# Patient Record
Sex: FEMALE | Race: WHITE | NOT HISPANIC OR LATINO | URBAN - METROPOLITAN AREA
[De-identification: names, ages, dates, MRNs, and addresses within clinical notes are randomized per-mention and may not be internally consistent; named-entity substitution may affect disease eponyms.]

---

## 2019-08-19 ENCOUNTER — HOSPITAL ENCOUNTER (OUTPATIENT)
Dept: PSYCHOLOGY | Facility: CLINIC | Age: 49
Discharge: HOME | End: 2019-08-19
Payer: COMMERCIAL

## 2019-08-19 DIAGNOSIS — G31.84 MILD COGNITIVE IMPAIRMENT: ICD-10-CM

## 2019-08-19 PROCEDURE — 96133 NRPSYC TST EVAL PHYS/QHP EA: CPT | Performed by: CLINICAL NEUROPSYCHOLOGIST

## 2019-08-19 PROCEDURE — 96137 PSYCL/NRPSYC TST PHY/QHP EA: CPT | Performed by: CLINICAL NEUROPSYCHOLOGIST

## 2019-08-19 PROCEDURE — 96136 PSYCL/NRPSYC TST PHY/QHP 1ST: CPT | Performed by: CLINICAL NEUROPSYCHOLOGIST

## 2019-08-19 PROCEDURE — 96132 NRPSYC TST EVAL PHYS/QHP 1ST: CPT | Performed by: CLINICAL NEUROPSYCHOLOGIST

## 2019-08-19 RX ORDER — TRAZODONE HYDROCHLORIDE 100 MG/1
TABLET ORAL
Refills: 0 | COMMUNITY
Start: 2019-07-16

## 2019-08-19 RX ORDER — MONTELUKAST SODIUM 10 MG/1
TABLET ORAL
Refills: 1 | COMMUNITY
Start: 2019-08-12

## 2019-08-19 RX ORDER — LANOLIN ALCOHOL/MO/W.PET/CERES
1 CREAM (GRAM) TOPICAL
Refills: 0 | COMMUNITY
Start: 2019-05-23

## 2019-08-19 RX ORDER — FLUOXETINE 10 MG/1
CAPSULE ORAL
Refills: 0 | COMMUNITY
Start: 2019-07-31

## 2019-08-19 RX ORDER — HYDROXYZINE HYDROCHLORIDE 50 MG/1
TABLET, FILM COATED ORAL
Refills: 0 | COMMUNITY
Start: 2019-07-16

## 2019-08-19 RX ORDER — FOLIC ACID 1 MG/1
1000 TABLET ORAL
Refills: 0 | COMMUNITY
Start: 2019-05-15

## 2019-08-19 RX ORDER — CYANOCOBALAMIN (VITAMIN B-12) 500 MCG
LOZENGE ORAL
Refills: 3 | COMMUNITY
Start: 2019-06-05

## 2019-08-19 RX ORDER — METOPROLOL SUCCINATE 100 MG/1
100 TABLET, EXTENDED RELEASE ORAL
Refills: 5 | COMMUNITY
Start: 2019-07-22

## 2019-08-19 RX ORDER — HYDROCHLOROTHIAZIDE 25 MG/1
25 TABLET ORAL
Refills: 1 | COMMUNITY
Start: 2019-07-16

## 2019-08-26 ENCOUNTER — HOSPITAL ENCOUNTER (OUTPATIENT)
Dept: PSYCHOLOGY | Facility: CLINIC | Age: 49
Discharge: HOME | End: 2019-08-26
Payer: COMMERCIAL

## 2019-08-26 DIAGNOSIS — G31.84 MILD COGNITIVE IMPAIRMENT: ICD-10-CM

## 2019-08-26 PROCEDURE — 96132 NRPSYC TST EVAL PHYS/QHP 1ST: CPT | Performed by: CLINICAL NEUROPSYCHOLOGIST

## 2019-08-29 NOTE — PROGRESS NOTES
NEUROPSYCHOLOGICAL EVALUATION    CONFIDENTIAL   FOR PROFESSIONAL USE ONLY    Name: Sarah Beth Person                                    : 1970  Evaluation date: 2019      Sarah Beth Person, : 1970, is a 49 y.o. right handed female, referred by Dr. Maynor Mcdermott for Comprehensive Neuropsychological Evaluation for concern of decline in short-term memory. Relevant information was obtained from a clinical interview with Sarah Beth Person and her mother, along with a review of available medical records.    Review of an office note with Dr. Mcdermott dated 2019 indicated that Ms. Person was seen in consultation for gait dysfunction and neuropathy.  She has a history of recent significant weight loss, alcohol abuse, acute pancreatitis, cognitive complaints including poor memory, confusion, and lack of comprehension, tingling of the hands and feet, pain, poor balance.  Records indicate her last full day of work was in 2019 and she was asked to leave.  Review of a brain MRI dated Iris 10, 2019 was notable for: 1.  No evidence of intracranial mass or acute infarct. 2.  White matter changes as described below which are nonspecific but can be seen with microangiopathy    According to the history provided, she has been experiencing cognitive, physical, and emotional changes over the past at least year and a half.  Cognitively, Ms. Person reported that she has been having difficulty with short-term memory, such as forgetting conversations, relying much more heavily on writing information down, and remembering things to do.  She reported difficulty with attention and concentration.  Her family described the changes as somewhat gradual but more dramatic around the months leading up to hospitalization for malnutrition in May 2019.  She has been experiencing multiple health complications.  She was hospitalized for acute pancreatitis in 2018 with associated weight loss and malnutrition.  At that time,  she had been drinking alcohol heavily.  She was also hospitalized in May 2019 for malnutrition.  She has since stopped drinking and nutrition has significantly improved.  She is currently living in a respite portion of assisted living and also receives support from her parents.  She has not been working since April 2019 subsequent to cognitive, physical, and emotional stress.  At this time, she is independent in all of her activities of daily living.  She does her own shopping.  She was recently cleared for driving by her podiatrist as she had voluntarily stopped due to neuropathy in her feet.  Up until April/May 2019 she was working full-time as a  but took an official leave of absence in May which has now been extended until October 2019.  She hopes to return to work.  She reported that her difficulties with work were more associated with her overall physical health status and depressed mood versus confusion or difficulty comprehending tasks at work.    Physically, as noted above, she has been improving in terms of nutrition and has remained abstinent from alcohol.  She has a prior history of gastric bypass surgery (approximately 15 years ago).  She reported appetite has been improving.  She does continue to experience mild balance deficits secondary to neuropathy but also states that this has been significantly improving.  She denied any problems with dizziness or falls.  She reported sleep is variable, often with middle insomnia.  She does take prescribed trazodone to aid sleep which she finds helpful.    Emotionally, Ms. Person reported a great deal of psychosocial stress associated with her health status, divorce, and custody arrangement with her 10-year old son.  She reported that prior to the stress she has been experiencing, she shared 50-50 custody with her ex- but has had less custody since.  She reported depressed mood and anxiety.  She has consulted with the office of Dr. Ramesh  Marcial for psychiatry and also initiated treatment with individual counseling.  However, she reported that she has not started with a regular therapist yet (has seen 2 different ones so far).  She denied suicidal ideation, roderick, hallucinations, or delusions.      Current Outpatient Prescriptions   Medication Sig Dispense Refill   • FLUoxetine (PROzac) 10 mg capsule TAKE 1 CAP IN THE AM WITH FOOD X 1 WEEK THEN INCREASE TO 2 CAPS DAILY IN AM FOR 3 WEEKS  0   • folic acid (FOLVITE) 1 mg tablet Take 1,000 mcg by mouth once daily.  0   • hydrochlorothiazide (HYDRODIURIL) 25 mg tablet Take 25 mg by mouth once daily.  1   • hydrOXYzine (ATARAX) 50 mg tablet TAKE 1 TABLET BY MOUTH EVERYDAY AT BEDTIME  0   • magnesium oxide (MAG-OX) 400 mg (241.3 mg magnesium) tablet Take 1 tablet by mouth once daily.  0   • metoprolol succinate XL (TOPROL-XL) 100 mg 24 hr tablet Take 100 mg by mouth once daily.  5   • montelukast (SINGULAIR) 10 mg tablet TAKE 1 TABLET BY MOUTH EVERYDAY AT BEDTIME  1   • traZODone (DESYREL) 100 mg tablet TAKE 1 TABLET BY MOUTH EVERYDAY AT BEDTIME  0   • VITAMIN B-12 500 mcg lozenge PLACE 2 TABLETS(1000MCG) UNDER TONGUE ONCE DAILY  3     No current facility-administered medications for this encounter.        Past Medical History:   Diagnosis Date   • Neuropathy    • Pancreatitis        Past Surgical History:   Procedure Laterality Date   • CHOLECYSTECTOMY     • GASTRIC BYPASS         Family History   Problem Relation Age of Onset   • Breast cancer Maternal Grandmother    • Diabetes Maternal Grandmother    • Early death Maternal Grandfather        Social History     Social History   • Marital status:      Spouse name: N/A   • Number of children: 1   • Years of education: 16     Occupational History   •       Social History Main Topics   • Smoking status: Current Some Day Smoker   • Smokeless tobacco: Never Used   • Alcohol use Yes      Comment: none recently   • Drug use: No   • Sexual  activity: Not Asked     Other Topics Concern   • None     Social History Narrative   • None       Past Psychological History: Ms. Person has a prior history of outpatient psychotherapy for depression and anxiety and adjustment concerns.  She has no history of inpatient psychiatric treatment.  She has a history of alcohol abuse but sober for the past several months.  She has no history of formal inpatient detoxification or rehabilitation.    Birth/Development History: Normal.     Education: Ms. Person earned her bachelor's degree in business administration in accounting from [employer].  She reported being an average to above average student, earning mostly A's and B's throughout high school and college.  She has no history of learning disability or ADHD.     Employment: Ms. Person has worked for the past 20 years as a .  She is currently on a leave of absence.      SUMMARY OF TEST RESULTS:     Behavioral Observations: Ms. Person arrived on time for the evaluation.  She was dressed casually and appropriately.  Speech was fluent for casual conversation.  Her mother assisted in providing more of the detailed background information and presenting problems.  Ms. Person was calm, pleasant, and cooperative throughout the evaluation.  Her mood/affect were euthymic. She appeared to put for sufficient effort and results are presumed to be an accurate depiction of current neuropsychological functioning.    Test Results:     Effort: On a formal measure of effort administered as part of the evaluation, she obtained adequate scores (TOMM, 49/50, 50/50). In addition, measures of effort embedded in other tests were also suggestive of sufficient effort (Reliable Digit Span = 11).     Intellectual Functioning: Her overall intellectual abilities fell within the average range (WAIS-IV, Full Scale IQ = 98, 45th percentile). Her estimated baseline abilities are likely in the average range based on her level  of education and performance on crystallized measures of intelligence. She performed in the average range on crystallized measures, assessing verbal reasoning [Verbal Comprehension Index (VCI), 55th percentile]. Her nonverbal reasoning skills were also average [Perceptual Reasoning Index (HALEIGH), 45th percentile]. Her auditory working memory and processing speed skills were average [Working Memory Index (WMI), 42nd percentile; Processing Speed Index (PSI), 42nd percentile].      Verbal Skills and Language: On specific tests within the language domain, her performance fell grossly within  Expectations, with mild weakness in verbal fluency. Her expressive vocabulary skills were in the average range (WAIS-IV, Vocabulary, 63rd percentile). General fund of information was also average (Information, 37th percentile). Her fluency using phonemic cues was low average and on the low end of average with semantic cues (COWAT, 16th percentile; ANT, 25th percentile).     Visual-Perceptual and Visual-Motor Skills: Her visual perceptual skills were intact. Specifically, block construction skills were average (WAIS-IV, Block Design, 63rd percentile). Her performance on a measure of spatial manipulation and part whole relationships was low average (Visual Puzzles, 9th percentile). On a paper and pencil measure assessing her visual motor coordination and speed, she performed in the average range (WAIS-IV, Coding, 63rd percentile) when required to transcribe abstract symbols.     Reasoning Skills: Measures of reasoning were intact. On a measure of verbal reasoning in which she identified conceptual similarities between target words, she scored in the average range (WAIS-IV, Similarities, 63rd percentile). Ms. Wells scored within the high average range on a subtest measuring pattern completion skills (WAIS-IV, Matrix Reasoning, 75th percentile).     Attention and Executive Functioning: Her verbal attention and auditory working memory  skills were intact. Her rote recall of numbers was average (WAIS-IV, Digit Span, 63rd percentile). She was able to recite a forward span of 6, backward span of 5, and sequencing span of 6. Her mental mathematics skills were average (WAIS-IV, Arithmetic, 25th percentile).     Her performance on paper and pencil measures requiring visual attention and speed fell within. She scored within the average range on a measure of visual scanning skills in which she scanned rows to find matching symbols (WAIS-IV, Symbol Search, 25th percentile). On a scanning and sequencing task, her ability to connect a series of numbers was high average for speed (Trail Making Test, Part A, 84th percentile).    Her performance on more complex executive skills showed variability. Specifically, on a test of rapid visual scanning and set-shifting between two ongoing and alternating series, her score was average with no errors (Trail Making Test, Part B, 37th percentile). On an untimed measure assessing novel abstract problem-solving, concept formation, mental flexibility, learning from mistakes and using working memory, her score was moderately impaired, in the borderline range. She completed 3/6 categories with an elevated number of perseverative responses (WCST Total Errors, 4th percentile).     Learning and Memory: In the verbal memory domain, her performance was significantly impaired. On a list-learning task (CVLT-3), her single trial learning was in the borderline range (Trial 1, 5th percentile). She demonstrated a flat learning curve reflecting an overall extremely low range range score (Trials 1-5 Free Recall, <1st percentile). After a short delay, her free and cued recall from the original target list was in the extremely low range (Short-Delay Free Recall, <1st percentile; Cued Recall, 1st percentile). Following a longer delay, her free and cued recall were also in the extremely low range (Long-Delay Free Recall, <1st percentile; Cued  Recall, 1st percentile). She recognized targets from non-targets on a recognition task with low average ability. Her immediate recall for two orally presented stories was in the borderline range and in the extremely low range after a 20-minute delay. She required verbal cues/promts to aid recall (WMS-IV, Logical Memory I, 5th percentile; Logical Memory II, 1st percentile).     In the visual memory realm, she scored in the extremely low range when producing simple designs from memory immediately after a brief exposure. Following a delay, her recall was also in the extremely low range (WMS-IV, Visual Reproduction I, 1st percentile; Visual Reproduction II, <1st percentile). Recognition recall fell below expectations (3-9th percentile).     Behavioral and Psychological Functioning: To assess the presence of psychological symptoms, she was administered self-report questionnaires. her overall score on a measure assessing for depression revealed moderate symptoms (BDI-II). A measure of anxiety (ALETHA) showed moderate symptoms.       Diagnostic Impression:  Ms. Sarah Beth Person is a 49-year-old, right-hand-dominant female who was seen for neuropsychological evaluation for an assessment of cognitive and emotional status in the context of a history of multiple medical complications and concern of cognitive changes.    Her baseline abilities are estimated to be in the average range.  Results of the current evaluation indicated a profile of overall average range intellectual functioning, with consistent scores across the various indices.  She demonstrated intact neurocognitive performance across measures of verbal comprehension, including expressive vocabulary, long-term recall, and verbal reasoning.  Her visual-spatial and perceptual reasoning skills were intact.  Her performance on measures of processing speed and auditory working memory also fell within expectations.  Her scores on measures of verbal fluency were mildly reduced,  in the low average to low end of average range.  However, there was evidence of more significant cognitive decline from estimates of baseline abilities on measures of verbal learning and memory and visual learning and memory.  Her results suggested difficulty encoding and recalling new information.  There was also evidence of moderate impairment in more complex abstract reasoning/executive functioning.  On self-report measures, she endorsed moderate symptoms of acute depression and anxiety.    Diagnostically, results are consistent with mild neurocognitive impairment, with primary weakness is in memory and executive functioning.  The etiology is likely multifactorial including recent medical complications, alcohol abuse, and depression and anxiety.  Repeat neuropsychological evaluation in 6 months will aid diagnostic clarity and will assist in determining if scores and cognitive skills are improving.     Recommendations:  1. Ms. Person has expressed interest in returning to work at the end of her leave of absence in October.  I would strongly recommend that she be participating in cognitive remediation/speech therapy to assist with developing compensatory strategies to aid weaknesses in memory and executive functioning.  I would also recommend that she would start back on a gradual, part-time basis.  2. I also strongly recommend that she be participating in regular counseling/psychotherapy to continue to develop adaptive coping strategies to manage depression and anxiety and continue to maintain sobriety.  She is currently following with the office of Dr. Ceja.   3. Neuropsychological re-evaluation in 6 months to assess for changes to aid diagnostic clarity and the current evaluation can serve as a baseline for future comparison.     Thank you for allowing me to participate in the care of your patient.  Please feel free to contact me at [phone] with any additional questions regarding this  report.    Respectfully submitted,         MARVIN Steiner.D

## 2019-09-03 NOTE — PROGRESS NOTES
Patient Name: Sarah Beth Person  YOB: 1970  Medical Record #: [id number]      Sarah Beth Person was seen for neuropsychological testing feedback on 8/26/19. Results of neuropsychological evaluation were reviewed with the patient and her parents. Patient expressed understanding and agreement with treatment recommendations.  Please refer to the full report for a comprehensive summary of diagnostic impressions and recommendations.     Report submitted to referring physician.

## 2019-09-04 ENCOUNTER — TRANSCRIBE ORDERS (OUTPATIENT)
Dept: SCHEDULING | Facility: REHABILITATION | Age: 49
End: 2019-09-04

## 2019-09-04 DIAGNOSIS — G31.84 MILD COGNITIVE IMPAIRMENT: ICD-10-CM

## 2019-09-04 DIAGNOSIS — R41.841 COGNITIVE COMMUNICATION DEFICIT: Primary | ICD-10-CM

## 2019-09-10 ENCOUNTER — HOSPITAL ENCOUNTER (OUTPATIENT)
Dept: SPEECH THERAPY | Facility: REHABILITATION | Age: 49
Setting detail: THERAPIES SERIES
Discharge: HOME | End: 2019-09-10
Attending: PSYCHIATRY & NEUROLOGY
Payer: COMMERCIAL

## 2019-09-10 DIAGNOSIS — G31.84 MILD COGNITIVE IMPAIRMENT: ICD-10-CM

## 2019-09-10 DIAGNOSIS — R41.841 COGNITIVE COMMUNICATION DEFICIT: ICD-10-CM

## 2019-09-10 PROCEDURE — 92523 SPEECH SOUND LANG COMPREHEN: CPT | Mod: GN

## 2019-09-10 NOTE — OP SLP TREATMENT LOG
Patient Specific Functional Scale (PSFS):  52.5%  Work Fulltime: 4/10  Socializin/10  Engaged in TV: 6/10  Caring for child: 5/10    21/40- 52.5%

## 2019-09-10 NOTE — LETTER
[address] [place] [address]  [phone]  [facility] Fax: [phone]    SPEECH THERAPY PLAN OF CARE    Patient Name: Sarah Beth Person    Certification Dates:  From: 09/10/19  To: 19  Frequency: 2 times/week     Duration: 8 weeks  Other:      Provider: Rosalinda Lilly CCC-SLP   Referring Provider: Maynor Mcdermott MD  PCP: Angelina Naqvi      Payor: Payor: [employer] / Plan: [employer] / Product Type: Commercial /   Medical Diagnosis: No primary diagnosis found.     Rehab Potential: good, to achieve stated therapy goals    Planned Services:  Speech therapies will focus on CPT 68358 Speech Lang Voice Comm and/or Auditory Proc (Treatment of speech, language, voice, communication and/or hearing processing disorder), .    By signing this plan of care, I certify this plan of care as correct and necessary for the patient.      Physician Signature: ________________________________    Date: _____________      Thank you for this referral. Please contact our department with any questions.     Rosalinda Lilyl CCC-SLP      Referring Provider: By co-signing this Plan of Care (POC), you agree with the planned services and interventions recommended by the therapist.         SLP EVALUATION FOR OUTPATIENT THERAPY    Patient: Sarah Beth Person   MRN: [id number]  : 1970 49 y.o.  Referring Physician: Maynor Mcdermott MD  Date of Visit: 9/10/2019      Certification Dates:  09/10/19 through 19    Recommended Frequency & Duration:  2 times/week for up to 8 weeks     Diagnosis:   1. Mild cognitive impairment    2. Cognitive communication deficit        Chief Complaints:   Chief Complaint   Patient presents with   • Cognition     overwhelmed, memory loss, language differences, inattentive       Precautions: no known precautions/restrictions, contact (Shingles diagnosis)    Past Medical History:   Past Medical History:   Diagnosis Date   • Neuropathy    • Pancreatitis        Past  Surgical History:   Past Surgical History:   Procedure Laterality Date   • CHOLECYSTECTOMY     • GASTRIC BYPASS           LEARNING ASSESSMENT    Assessment completed: Yes    Learner name:  Sarah Beth Person    Relationship: Patient    Learning Barriers:  Learning barriers: Emotional and Cognitive    Preferred Language: English     Needed: No    Learning New Concepts: Demonstration      CO-LEARNER ASSESSMENT:    Completed: No            OBJECTIVE MEASUREMENTS/DATA:    Eval Assessment         Evaluation Assessment and Plan - 09/10/19 3353        Evaluation Assessment and Plan    Plan of Care reviewed and patient/family in agreement Yes    Comments Discussed focus of therapy to focus on Time management, Executive Function, Attention, auditory and visual, problem solving and memory.      Speech Pathology Potential/Prognosis good, to achieve stated therapy goals    Problem List impaired cognition    Actions taken Continue with counseling     Clinical Assessment Ms. Person, a 48 yo female presents to the [facility] department for an evaluation of current cogntive skills following progressive decline in cognitive function.  Ms. Person present with cognitive communication deficits judged as mild to moderate.  Ms. Person identified moderate attention deficits characterized by mild to moderate deficits in focusing, sustaining, slecting and alternating attention.  Deficits in attention result in decreased recall, problem solving and planning and intiiation.  Ms. Person would benefit from skilled speech therapy to address strategies in attention, time management, and planning to support memory initation and execution.      Planned Services CPT 51720 Speech Lang Voice Comm and/or Auditory Proc (Treatment of speech, language, voice, communication and/or hearing processing disorder)        General Information         General Information - 09/10/19 0429        Session Details     Document Type initial evaluation    Mode of Treatment individual therapy;speech language pathology    Patient/Family Observations Pt was 10 minutes late to evaluation secondary to traffic from [place].         Time Calculation    Start Time 0809    Stop Time 0900    Time Calculation (min) 51 min       General Information    Onset of Illness/Injury or Date of Surgery 06/10/19    Referring Physician Dr. Mcdermott    Pertinent History of Current Functional Problem Pt report progression since 3 years depression progressed, leave from work early spring 2019, went to parents in [place], admitted to assisted living, assisted with meals and meds, for appoximately 3 weeks, continued to stay with parents, returned home, Last month has been going to counseling and physicians.  Counseling with Dr. Ceja in [place].  MRI dated:     Existing Precautions/Restrictions no known precautions/restrictions;contact   Shingles diagnosis        Pain and Vitals         Pain and Vitals - 09/10/19 0818        Pain/Comfort/Sleep    Presence of Pain complains of pain/discomfort    Preferred Pain Scale number (Numeric Rating Pain Scale)    Pain Body Location leg   abdomen    Pain Rating (0-10): Pre Activity 4       Pain Interventions    Intervention none    Post Intervention Comments none        Living Environment         Living Environment - 09/10/19 0831        Living Environment    Lives With alone;child(glen), dependent   10 year old Ryan    Living Arrangements apartment    Transportation Available car   returning to driving        PLOF         Prior Level of Function - 09/10/19 0832        OTHER    Previous level of function Fulltime as a , mother of a ten year old, spending time with 10 year old and various sports, spend time with friends.         Falls Assessment         Falls Assessment - 09/10/19 0834        Initial Falls Assessment    One or more falls in the last year No        Language Assessment          Language  Assessment - 09/10/19 0921        Expressive Language Assessment    Pragmatics (Communication) flat affect;initiation problems;other (see comments);mild impairment;topic maintenance problems    Comment, Conversational Speech Ongoing observation, consider relevance, topic maintenance, word retrieval in conversation.     Verbal Repetition mild impairment   Decreased sentence repetition on RIPA-2, suspect secondary to decreased auditory attention    Comment, Expression Ongoing assessment for effects of cognitive changes on language organization.        Comprehension Assessment    Auditory Comprehension (Communication) mild impairment    Able to Follow Commands (Communication) success, 2-step commands;other (see comments)   self interruption resulted in inability to recall 3-step commands.     Comment HCLS Immediate: Complex Ideational Material, Paragraph Auditory comprehension- 3/10        Cognition         Executive Function and Cognition - 09/10/19 0841        Executive Function Assessment    Executive Function divergent reasoning skill impairment;evaluative thinking (judgment) is impaired;initiation of behavior to achieve desired result is impaired;unaware of strengths and weaknesses in executive function;mental flexibility is impaired;organizational analysis is impaired;processing skills are inadequate;unable to plan/organize behaviors to achieve desired result    Comment, Executive Function Pt was required encouragement to 'go on' with task, difficulty 'regrouping' withing timed task, attempt to discontinue.        Memory    Immediate memory RIPA-2: 27/30, 91st, HCLS- Immediate Memory for Complex Ideational Material: 3/10,     Delayed memory Immediate Delay: 3/3, 10 min delay 0/3    Working memory Mental calculation: 3/5, Mental Manipulation: 2/3       Attention    Focused Attention Results of Attention Rating Scale (self rating) indicate MODERATE to SEVERE identified problems in overall ATTENTION.  MODERATE  FOCOSED ATTENTION problems: Slowness in responding, Needing prompting to get on with things, Feeling spaced out or blank    Sustained Attention MILD to MODERATE SUSTAINED ATTENTION: Reported SEVERE problems with: Finding attention wanders more easily, Feeling Restless; MODERATE problems with: Concentrating for long periods of time, Getting Mentally tired more easily, MILD problems with: Difficulty sticking to task    Selective Attention MODERATE SELECTIVE ATTENTION: Reported SEVERE problems with: Being easily distracted; MODERATE problems with: Making mistakes because of thinking of something else; MILD problems with: Spending time daydreaming    Alternating Attention MILD to MODERATE ALTERNATING: Reported MODERATE problems with: Dealing with more than one thing at a time, Losing track in the middle of conversations; MILD problems with Confusion when there is a lot going on        Functional Comm Measures         Functional Communication Measures - 19 1251        Functional Communication Measures    FCM: Attention 4-->Level 4    FCM: Memory 5-->Level 5    FCM: Problem Solving 5-->Level 5          TREATMENT PLAN:      Patient Specific Functional Scale (PSFS):  52.5%  Work Fulltime: 4/10  Socializin/10  Engaged in TV: 6/10  Caring for child: 5/10    21/40- 52.5%      GOALS:    Goals     • Cognition                                Goals Short-Long Time Frame Result Comment   Pt will demonstrate improved NOMS scores in:  Attention-6  Memory-7  Problem Solving-7    Pt will improve PSFS to >/=80%     Pt will report improved self-rating scores on the:  Rating scale for attention: from moderate to mild.     Rating score for processing from Mild to WNL     Pt will demonstrate carryover of HEP    LTG                    LTG            LTG          TBD 12 w                    12 w            12 w   Baseline:   Attention:5  Memory:5  Problem Solvin            Baseline: 53%      Baseline: Attention Rating Scale: 2.9               Baseline: Processing Rating Scale: mild   EDUCATION:  Pt will identify cognitive demands with level of attention required provided written handout and review, 80% of trials, supervision     Pt will report maintenance of a 3 or less over 2 weeks on the attention fatigue rating scale.      STG              STG    3 w              8 w       MEMORY:  Pt will adapt a version of 12 phases of time management to improve memory of daily/weekly/monthly scheduled events, as support planning and execution for home tasks, supervision.     Pt will improve immediate memory for  repetition of complex sentences, 90% of trials, paragraph retell, 80% of detail inclusion.      Pt will demonstrate WM for mental calculations and manipulation tasks for moderate tasks, 90% of trials     Pt will demonstrate 30+ minutes delayed recall of named words or activities 90% of trials. STG                           STG                STG  4 w                         6 w                 8 w       ATTENTION:  Pt will complete moderate to complex sustained attention task and report 1-2 on: concentrating for a long period of time, Finding attention wanders more easily, getting mentally tired more easily, supervision    Pt will complete the above goal in distraction, supervision STG 6w       PROCESSING:   Pt will complete activities for improved processing speed to improve cognitive speed as demonstrated by improvement in baseline measures and self report.   STG 8 w   Baseline on APT TBD                                                                   EVALUATION AND ASSESSMENT:          Evaluation Assessment and Plan - 09/10/19 1253        Evaluation Assessment and Plan    Plan of Care reviewed and patient/family in agreement Yes    Comments Discussed focus of therapy to focus on Time management, Executive Function, Attention, auditory and visual, problem solving and memory.      Speech Pathology Potential/Prognosis good, to achieve stated  therapy goals    Problem List impaired cognition    Actions taken Continue with counseling     Clinical Assessment Ms. Person, a 48 yo female presents to the [facility] department for an evaluation of current cogntive skills following progressive decline in cognitive function.  Ms. Person present with cognitive communication deficits judged as mild to moderate.  Ms. Person identified moderate attention deficits characterized by mild to moderate deficits in focusing, sustaining, slecting and alternating attention.  Deficits in attention result in decreased recall, problem solving and planning and intiiation.  Ms. Person would benefit from skilled speech therapy to address strategies in attention, time management, and planning to support memory initation and execution.      Planned Services CPT 85383 Speech Lang Voice Comm and/or Auditory Proc (Treatment of speech, language, voice, communication and/or hearing processing disorder)                    T, .

## 2019-09-15 NOTE — PROGRESS NOTES
Referring Provider: By co-signing this Plan of Care (POC), you agree with the planned services and interventions recommended by the therapist.         SLP EVALUATION FOR OUTPATIENT THERAPY    Patient: Sarah Beth Person   MRN: [id number]  : 1970 49 y.o.  Referring Physician: Maynor Mcdermott MD  Date of Visit: 9/10/2019      Certification Dates:  09/10/19 through 19    Recommended Frequency & Duration:  2 times/week for up to 8 weeks     Diagnosis:   1. Mild cognitive impairment    2. Cognitive communication deficit        Chief Complaints:   Chief Complaint   Patient presents with   • Cognition     overwhelmed, memory loss, language differences, inattentive       Precautions: no known precautions/restrictions, contact (Shingles diagnosis)    Past Medical History:   Past Medical History:   Diagnosis Date   • Neuropathy    • Pancreatitis        Past Surgical History:   Past Surgical History:   Procedure Laterality Date   • CHOLECYSTECTOMY     • GASTRIC BYPASS           LEARNING ASSESSMENT    Assessment completed: Yes    Learner name:  Sarah Beth Person    Relationship: Patient    Learning Barriers:  Learning barriers: Emotional and Cognitive    Preferred Language: English     Needed: No    Learning New Concepts: Demonstration      CO-LEARNER ASSESSMENT:    Completed: No            OBJECTIVE MEASUREMENTS/DATA:    Eval Assessment         Evaluation Assessment and Plan - 09/10/19 1253        Evaluation Assessment and Plan    Plan of Care reviewed and patient/family in agreement Yes    Comments Discussed focus of therapy to focus on Time management, Executive Function, Attention, auditory and visual, problem solving and memory.      Speech Pathology Potential/Prognosis good, to achieve stated therapy goals    Problem List impaired cognition    Actions taken Continue with counseling     Clinical Assessment Ms. Person, a 50 yo female presents to the [facility]  [facility]ology department for an evaluation of current cogntive skills following progressive decline in cognitive function.  Ms. Person present with cognitive communication deficits judged as mild to moderate.  Ms. Person identified moderate attention deficits characterized by mild to moderate deficits in focusing, sustaining, slecting and alternating attention.  Deficits in attention result in decreased recall, problem solving and planning and intiiation.  Ms. Person would benefit from skilled speech therapy to address strategies in attention, time management, and planning to support memory initation and execution.      Planned Services CPT 89449 Speech Lang Voice Comm and/or Auditory Proc (Treatment of speech, language, voice, communication and/or hearing processing disorder)        General Information         General Information - 09/10/19 0819        Session Details    Document Type initial evaluation    Mode of Treatment individual therapy;speech language pathology    Patient/Family Observations Pt was 10 minutes late to evaluation secondary to traffic from [place].         Time Calculation    Start Time 0809    Stop Time 0900    Time Calculation (min) 51 min       General Information    Onset of Illness/Injury or Date of Surgery 06/10/19    Referring Physician Dr. Mcdermott    Pertinent History of Current Functional Problem Pt report progression since 3 years depression progressed, leave from work early spring 2019, went to parents in [place], admitted to assisted living, assisted with meals and meds, for appoximately 3 weeks, continued to stay with parents, returned home, Last month has been going to counseling and physicians.  Counseling with Dr. Ceja in [place].  MRI dated:     Existing Precautions/Restrictions no known precautions/restrictions;contact   Shingles diagnosis        Pain and Vitals         Pain and Vitals - 09/10/19 0818        Pain/Comfort/Sleep    Presence of Pain complains of pain/discomfort     Preferred Pain Scale number (Numeric Rating Pain Scale)    Pain Body Location leg   abdomen    Pain Rating (0-10): Pre Activity 4       Pain Interventions    Intervention none    Post Intervention Comments none        Living Environment         Living Environment - 09/10/19 0831        Living Environment    Lives With alone;child(glen), dependent   10 year old Ryan    Living Arrangements apartment    Transportation Available car   returning to driving        PLOF         Prior Level of Function - 09/10/19 0832        OTHER    Previous level of function Fulltime as a , mother of a ten year old, spending time with 10 year old and various sports, spend time with friends.         Falls Assessment         Falls Assessment - 09/10/19 0834        Initial Falls Assessment    One or more falls in the last year No        Language Assessment          Language Assessment - 09/10/19 0921        Expressive Language Assessment    Pragmatics (Communication) flat affect;initiation problems;other (see comments);mild impairment;topic maintenance problems    Comment, Conversational Speech Ongoing observation, consider relevance, topic maintenance, word retrieval in conversation.     Verbal Repetition mild impairment   Decreased sentence repetition on RIPA-2, suspect secondary to decreased auditory attention    Comment, Expression Ongoing assessment for effects of cognitive changes on language organization.        Comprehension Assessment    Auditory Comprehension (Communication) mild impairment    Able to Follow Commands (Communication) success, 2-step commands;other (see comments)   self interruption resulted in inability to recall 3-step commands.     Comment HCLS Immediate: Complex Ideational Material, Paragraph Auditory comprehension- 3/10        Cognition         Executive Function and Cognition - 09/10/19 0841        Executive Function Assessment    Executive Function divergent reasoning skill  impairment;evaluative thinking (judgment) is impaired;initiation of behavior to achieve desired result is impaired;unaware of strengths and weaknesses in executive function;mental flexibility is impaired;organizational analysis is impaired;processing skills are inadequate;unable to plan/organize behaviors to achieve desired result    Comment, Executive Function Pt was required encouragement to 'go on' with task, difficulty 'regrouping' withing timed task, attempt to discontinue.        Memory    Immediate memory RIPA-2: 27/30, 91st, HCLS- Immediate Memory for Complex Ideational Material: 3/10,     Delayed memory Immediate Delay: 3/3, 10 min delay 0/3    Working memory Mental calculation: 3/5, Mental Manipulation: 2/3       Attention    Focused Attention Results of Attention Rating Scale (self rating) indicate MODERATE to SEVERE identified problems in overall ATTENTION.  MODERATE FOCOSED ATTENTION problems: Slowness in responding, Needing prompting to get on with things, Feeling spaced out or blank    Sustained Attention MILD to MODERATE SUSTAINED ATTENTION: Reported SEVERE problems with: Finding attention wanders more easily, Feeling Restless; MODERATE problems with: Concentrating for long periods of time, Getting Mentally tired more easily, MILD problems with: Difficulty sticking to task    Selective Attention MODERATE SELECTIVE ATTENTION: Reported SEVERE problems with: Being easily distracted; MODERATE problems with: Making mistakes because of thinking of something else; MILD problems with: Spending time daydreaming    Alternating Attention MILD to MODERATE ALTERNATING: Reported MODERATE problems with: Dealing with more than one thing at a time, Losing track in the middle of conversations; MILD problems with Confusion when there is a lot going on        Functional Comm Measures         Functional Communication Measures - 09/11/19 1251        Functional Communication Measures    FCM: Attention 4-->Level 4    FCM:  Memory 5-->Level 5    FCM: Problem Solving 5-->Level 5          TREATMENT PLAN:      Patient Specific Functional Scale (PSFS):  52.5%  Work Fulltime: 4/10  Socializin/10  Engaged in TV: 6/10  Caring for child: 5/10    21/40- 52.5%      GOALS:    Goals     • Cognition                                Goals Short-Long Time Frame Result Comment   Pt will demonstrate improved NOMS scores in:  Attention-6  Memory-7  Problem Solving-7    Pt will improve PSFS to >/=80%     Pt will report improved self-rating scores on the:  Rating scale for attention: from moderate to mild.     Rating score for processing from Mild to WNL     Pt will demonstrate carryover of HEP    LTG                    LTG            LTG          TBD 12 w                    12 w            12 w   Baseline:   Attention:5  Memory:5  Problem Solvin            Baseline: 53%      Baseline: Attention Rating Scale: 2.9              Baseline: Processing Rating Scale: mild   EDUCATION:  Pt will identify cognitive demands with level of attention required provided written handout and review, 80% of trials, supervision     Pt will report maintenance of a 3 or less over 2 weeks on the attention fatigue rating scale.      STG              STG    3 w              8 w       MEMORY:  Pt will adapt a version of 12 phases of time management to improve memory of daily/weekly/monthly scheduled events, as support planning and execution for home tasks, supervision.     Pt will improve immediate memory for  repetition of complex sentences, 90% of trials, paragraph retell, 80% of detail inclusion.      Pt will demonstrate WM for mental calculations and manipulation tasks for moderate tasks, 90% of trials     Pt will demonstrate 30+ minutes delayed recall of named words or activities 90% of trials. STG                           STG                STG  4 w                         6 w                 8 w       ATTENTION:  Pt will complete moderate to complex sustained  attention task and report 1-2 on: concentrating for a long period of time, Finding attention wanders more easily, getting mentally tired more easily, supervision    Pt will complete the above goal in distraction, supervision STG 6w       PROCESSING:   Pt will complete activities for improved processing speed to improve cognitive speed as demonstrated by improvement in baseline measures and self report.   STG 8 w   Baseline on APT TBD                                                                   EVALUATION AND ASSESSMENT:          Evaluation Assessment and Plan - 09/10/19 1253        Evaluation Assessment and Plan    Plan of Care reviewed and patient/family in agreement Yes    Comments Discussed focus of therapy to focus on Time management, Executive Function, Attention, auditory and visual, problem solving and memory.      Speech Pathology Potential/Prognosis good, to achieve stated therapy goals    Problem List impaired cognition    Actions taken Continue with counseling     Clinical Assessment Ms. Person, a 48 yo female presents to the [facility] department for an evaluation of current cogntive skills following progressive decline in cognitive function.  Ms. Person present with cognitive communication deficits judged as mild to moderate.  Ms. Person identified moderate attention deficits characterized by mild to moderate deficits in focusing, sustaining, slecting and alternating attention.  Deficits in attention result in decreased recall, problem solving and planning and intiiation.  Ms. Person would benefit from skilled speech therapy to address strategies in attention, time management, and planning to support memory initation and execution.      Planned Services CPT 27568 Speech Lang Voice Comm and/or Auditory Proc (Treatment of speech, language, voice, communication and/or hearing processing disorder)                    T, .

## 2019-09-16 ENCOUNTER — HOSPITAL ENCOUNTER (OUTPATIENT)
Dept: SPEECH THERAPY | Facility: REHABILITATION | Age: 49
Setting detail: THERAPIES SERIES
Discharge: HOME | End: 2019-09-16
Attending: PSYCHIATRY & NEUROLOGY
Payer: COMMERCIAL

## 2019-09-16 DIAGNOSIS — G31.84 MILD COGNITIVE IMPAIRMENT: Primary | ICD-10-CM

## 2019-09-16 PROCEDURE — 92507 TX SP LANG VOICE COMM INDIV: CPT | Mod: GN

## 2019-09-16 NOTE — OP SLP TREATMENT LOG
COGNITION SLP FLOW SHEET    SKILL AREA CURRENT SESSION   SPEECH THERAPY: COGNITION    Attention  Moderate to complex sustained attention:  Rate 1-2 on:  Concentrating for long periods of time  Mind wanders more easily  Getting mentally tired more easily    Complete in distraction   Oriented to 5 types of attention, provided packet.  Introduced modification strategies for Difficulty, familiarity, enjoyment   Orientation    Memory  Memory planner-12 phases- adapted to pt    Immediate memory:   Rep of complex sentences-90% of trials  Paragraph retell 80% of details    WM:Moderate  Mental calc  Mental manip    30+ min of delayed recall     Processing APT baseline:   Sustained: See below  Auditory  Visual   Problem Solving/Reasoning (Money/Math, Safety , Multistep )    Time Management    Planning/Organization (Executive Functioning)    Symptom  Management  Attention Fatigue Scale: 3 or less    PSFS: goal: 80%   Pt states often finds self to be in 4-6, at times higher.    Education/ Strategy Training Provided handouts.    Other    APT Tracking    Sustained attention  Date 9/16/19     Tasks              Ascending # (aud) 72% 3 effort  fast     Matching clocks (vis) 73% 5 effort  fast

## 2019-09-16 NOTE — PROGRESS NOTES
SLP DAILY NOTE FOR OUTPATIENT THERAPY    Patient: Sarah Beth Person   MRN: [id number]  : 1970 49 y.o.  Referring Physician: Maynor Mcdermott MD  Date of Visit: 2019      Certification Dates: 09/10/19 through 19    Diagnosis:   1. Mild cognitive impairment        Chief Complaints:   Chief Complaint   Patient presents with   • Cognition     concentrating       Precautions:      TODAY'S VISIT:          General Information - 19 1410        Session Details    Document Type daily treatment    Mode of Treatment individual therapy;speech language pathology    Patient/Family Observations Continues to feel stresseed about not having her son on a regular basis.         Time Calculation    Start Time 1400    Stop Time 1500    Time Calculation (min) 60 min              Pain and Vitals - 19 1408        Pain/Comfort/Sleep    Presence of Pain denies       Pain Interventions    Intervention none    Post Intervention Comments none              Daily Falls Screen - 19 1412        Daily Falls Assessment    Patient reported fall since last visit No              Daily Treatment Assessment and Plan - 19 1844        Daily Treatment Assessment and Plan    Progress toward goals Progressing    Daily Outcome Summary Pt verbalizes and demonstrates understanding to education and awareness exercises.     Plan and Recommendations Introduce 12 phases of time management to adapt process.           OBJECTIVE MEASUREMENTS TAKEN TODAY:    None Taken    Today's Treatment:      COGNITION SLP FLOW SHEET    SKILL AREA CURRENT SESSION   SPEECH THERAPY: COGNITION    Attention  Moderate to complex sustained attention:  Rate 1-2 on:  Concentrating for long periods of time  Mind wanders more easily  Getting mentally tired more easily    Complete in distraction   Oriented to 5 types of attention, provided packet.  Introduced modification strategies for Difficulty, familiarity, enjoyment   Orientation    Memory  Memory  planner-12 phases- adapted to pt    Immediate memory:   Rep of complex sentences-90% of trials  Paragraph retell 80% of details    WM:Moderate  Mental calc  Mental manip    30+ min of delayed recall     Processing APT baseline:   Sustained: See below  Auditory  Visual   Problem Solving/Reasoning (Money/Math, Safety , Multistep )    Time Management    Planning/Organization (Executive Functioning)    Symptom  Management  Attention Fatigue Scale: 3 or less    PSFS: goal: 80%   Pt states often finds self to be in 4-6, at times higher.    Education/ Strategy Training Provided handouts.    Other    APT Tracking    Sustained attention  Date 9/16/19     Tasks              Ascending # (aud) 72% 3 effort  fast     Matching clocks (vis) 73% 5 effort  fast

## 2019-09-19 ENCOUNTER — HOSPITAL ENCOUNTER (OUTPATIENT)
Dept: SPEECH THERAPY | Facility: REHABILITATION | Age: 49
Setting detail: THERAPIES SERIES
Discharge: HOME | End: 2019-09-19
Attending: PSYCHIATRY & NEUROLOGY
Payer: COMMERCIAL

## 2019-09-19 DIAGNOSIS — G31.84 MILD COGNITIVE IMPAIRMENT: Primary | ICD-10-CM

## 2019-09-19 PROCEDURE — 92507 TX SP LANG VOICE COMM INDIV: CPT | Mod: GN

## 2019-09-19 NOTE — PROGRESS NOTES
"SLP DAILY NOTE FOR OUTPATIENT THERAPY    Patient: Sarah Beth Person   MRN: [id number]  : 1970 49 y.o.  Referring Physician: Maynor Mcdermott MD  Date of Visit: 2019      Certification Dates: 09/10/19 through 19    Diagnosis:   1. Mild cognitive impairment        Chief Complaints:   Chief Complaint   Patient presents with   • Cognition     Memory retention in STM       Precautions:      TODAY'S VISIT:          General Information - 19 1119        Session Details    Document Type daily treatment    Mode of Treatment individual therapy;speech language pathology    Patient/Family Observations Discussed strategy for time management.   Pt reported use to be \"human compass\" and now she relies on a map.        Time Calculation    Start Time 1100    Stop Time 1200    Time Calculation (min) 60 min              Pain and Vitals - 19 1119        Pain/Comfort/Sleep    Presence of Pain complains of pain/discomfort       Pain Interventions    Intervention none    Post Intervention Comments none              Daily Falls Screen - 19 1120        Daily Falls Assessment    Patient reported fall since last visit No              Daily Treatment Assessment and Plan - 19 1234        Daily Treatment Assessment and Plan    Progress toward goals Progressing    Daily Outcome Summary Pt verbalizes understanding for recommendations of memory strategies through use of planner.     Plan and Recommendations follow up on 5 attention types, examples, time management 1-8, continue to introduce 9-12          OBJECTIVE MEASUREMENTS TAKEN TODAY:    None Taken    Today's Treatment:      COGNITION SLP FLOW SHEET    SKILL AREA Progress CURRENT SESSION   SPEECH THERAPY: COGNITION     Attention  Moderate to complex sustained attention:  Rate 1-2 on:  Concentrating for long periods of time  Mind wanders more easily  Getting mentally tired more easily    Complete in distraction   Oriented to 5 types of attention, " "provided packet.  Introduced modification strategies for Difficulty, familiarity, enjoyment   Referenced focused, sustained and selective attention in examples for time management   Plan: Review 5 types with use of    Orientation     Memory  Memory planner-12 phases- adapted to pt    Immediate memory:   Rep of complex sentences-90% of trials  Paragraph retell 80% of details    WM:Moderate  Mental calc  Mental manip    30+ min of delayed recall     Oriented to 8 of 12 phases of time management/memory log    Pt brought in 'sheet of paper\" with Month at a glance.  Pt understands benefits of written planner in place of use of phone to benefit cognitive function.    Processing APT baseline:   Sustained: See below  Auditory  Visual    Problem Solving/Reasoning (Money/Math, Safety , Multistep )     Time Management  Practiced phase 8 with monthly and daily.  Utilized inclusion practice of phase 1-6    Planning/Organization (Executive Functioning)  Brief discussion on goal stating and 'written commitments\", planning when using a planner.    Symptom  Management  Attention Fatigue Scale: 3 or less    PSFS: goal: 80%   Pt states often finds self to be in 4-6, at times higher.     Education/ Strategy Training Provided attention packet and attention fatigue scale.  Provided time management packet   Other    Reviewed sleep routines and target goals, meal routines, water recommendations.     APT Tracking    Sustained attention  Date 9/16/19     Tasks              Ascending # (aud) 72% 3 effort  fast     Matching clocks (vis) 73% 5 effort  fast                                     "

## 2019-09-19 NOTE — OP SLP TREATMENT LOG
"COGNITION SLP FLOW SHEET    SKILL AREA Progress CURRENT SESSION   SPEECH THERAPY: COGNITION     Attention  Moderate to complex sustained attention:  Rate 1-2 on:  Concentrating for long periods of time  Mind wanders more easily  Getting mentally tired more easily    Complete in distraction   Oriented to 5 types of attention, provided packet.  Introduced modification strategies for Difficulty, familiarity, enjoyment   Referenced focused, sustained and selective attention in examples for time management   Plan: Review 5 types with use of    Orientation     Memory  Memory planner-12 phases- adapted to pt    Immediate memory:   Rep of complex sentences-90% of trials  Paragraph retell 80% of details    WM:Moderate  Mental calc  Mental manip    30+ min of delayed recall     Oriented to 8 of 12 phases of time management/memory log    Pt brought in 'sheet of paper\" with Month at a glance.  Pt understands benefits of written planner in place of use of phone to benefit cognitive function.    Processing APT baseline:   Sustained: See below  Auditory  Visual    Problem Solving/Reasoning (Money/Math, Safety , Multistep )     Time Management  Practiced phase 8 with monthly and daily.  Utilized inclusion practice of phase 1-6    Planning/Organization (Executive Functioning)  Brief discussion on goal stating and 'written commitments\", planning when using a planner.    Symptom  Management  Attention Fatigue Scale: 3 or less    PSFS: goal: 80%   Pt states often finds self to be in 4-6, at times higher.     Education/ Strategy Training Provided attention packet and attention fatigue scale.  Provided time management packet   Other    Reviewed sleep routines and target goals, meal routines, water recommendations.     APT Tracking    Sustained attention  Date 9/16/19     Tasks              Ascending # (aud) 72% 3 effort  fast     Matching clocks (vis) 73% 5 effort  fast           "

## 2019-09-23 ENCOUNTER — HOSPITAL ENCOUNTER (OUTPATIENT)
Dept: SPEECH THERAPY | Facility: REHABILITATION | Age: 49
Setting detail: THERAPIES SERIES
Discharge: HOME | End: 2019-09-23
Attending: PSYCHIATRY & NEUROLOGY
Payer: COMMERCIAL

## 2019-09-23 DIAGNOSIS — G31.84 MILD COGNITIVE IMPAIRMENT: Primary | ICD-10-CM

## 2019-09-23 PROCEDURE — 92507 TX SP LANG VOICE COMM INDIV: CPT | Mod: GN

## 2019-09-23 NOTE — PROGRESS NOTES
SLP DAILY NOTE FOR OUTPATIENT THERAPY    Patient: Sarah Beth Person   MRN: [id number]  : 1970 49 y.o.  Referring Physician: Maynor Mcdermott MD  Date of Visit: 2019      Certification Dates:   through      Diagnosis:   1. Mild cognitive impairment        Chief Complaints:   Chief Complaint   Patient presents with   • Cognition     attention, memory       Precautions:      TODAY'S VISIT:          General Information - 19 1511        Session Details    Document Type daily treatment    Mode of Treatment individual therapy;speech language pathology    Patient/Family Observations brought in new planner       Time Calculation    Start Time 1500    Stop Time 1600    Time Calculation (min) 60 min              Pain and Vitals - 19 1511        Pain/Comfort/Sleep    Presence of Pain denies       Pain Interventions    Intervention none    Post Intervention Comments none              Daily Falls Screen - 19 1512        Daily Falls Assessment    Patient reported fall since last visit No              Daily Treatment Assessment and Plan - 19 1625        Daily Treatment Assessment and Plan    Progress toward goals Progressing    Daily Outcome Summary Pt requires mod A-Max A to utilize all benefits from calendar.      Plan and Recommendations follow up on 5 attention types, examples, time management 1-8, continue to introduce 9-12, continue with working memory.           OBJECTIVE MEASUREMENTS TAKEN TODAY:    None Taken    Today's Treatment:      COGNITION SLP FLOW SHEET    SKILL AREA Progress CURRENT SESSION   SPEECH THERAPY: COGNITION     Attention  Moderate to complex sustained attention:  Rate 1-2 on:  Concentrating for long periods of time  Mind wanders more easily  Getting mentally tired more easily    Complete in distraction   Oriented to 5 types of attention, provided packet.  Introduced modification strategies for Difficulty, familiarity, enjoyment   Pt did not bring folder with handouts.   "Pt unable to recall types of attention from memory.  Max A to D to recall 4 types.  Identified 3 types in Mental manipulation task.  Focused, sustained, selective.  Internal distractions resulted in errors.     Orientation     Memory  Memory planner-12 phases- adapted to pt    Immediate memory:   Rep of complex sentences-90% of trials  Paragraph retell 80% of details    WM:Moderate  Mental calc  Mental manip    30+ min of delayed recall     New planner. Has all recommend areas.  Pt required max A to use dailies.     Mental Manipulation 7/11, fatigue due to internal distraction  +++-+++  +---, pt became tearful due to frustration.  Task Dc'd.        Processing APT baseline:   Sustained: See below  Auditory  Visual    Problem Solving/Reasoning (Money/Math, Safety , Multistep )     Time Management  Reviewed with examples from pt calendar. Phase 1-8, 9   Planning/Organization (Executive Functioning)  Continued discussion on goal stating and 'written commitments\", planning when using a planner.    Symptom  Management  Attention Fatigue Scale: 3 or less    PSFS: goal: 80%   Pt states often finds self to be in 4-6, at times higher.  4, 5, DC'd task before therapist could recommend a break.    Education/ Strategy Training Provided attention packet and attention fatigue scale.  Encouraged to bring folder back each session.    Other    Reviewed sleep routines (11-7) and target goals, meal routines, water recommendations.     APT Tracking    Sustained attention  Date 9/16/19     Tasks              Ascending # (aud) 72% 3 effort  fast     Matching clocks (vis) 73% 5 effort  fast                                     "

## 2019-09-23 NOTE — OP SLP TREATMENT LOG
"COGNITION SLP FLOW SHEET    SKILL AREA Progress CURRENT SESSION   SPEECH THERAPY: COGNITION     Attention  Moderate to complex sustained attention:  Rate 1-2 on:  Concentrating for long periods of time  Mind wanders more easily  Getting mentally tired more easily    Complete in distraction   Oriented to 5 types of attention, provided packet.  Introduced modification strategies for Difficulty, familiarity, enjoyment   Pt did not bring folder with handouts.  Pt unable to recall types of attention from memory.  Max A to D to recall 4 types.  Identified 3 types in Mental manipulation task.  Focused, sustained, selective.  Internal distractions resulted in errors.     Orientation     Memory  Memory planner-12 phases- adapted to pt    Immediate memory:   Rep of complex sentences-90% of trials  Paragraph retell 80% of details    WM:Moderate  Mental calc  Mental manip    30+ min of delayed recall     New planner. Has all recommend areas.  Pt required max A to use dailies.     Mental Manipulation 7/11, fatigue due to internal distraction  +++-+++  +---, pt became tearful due to frustration.  Task Dc'd.        Processing APT baseline:   Sustained: See below  Auditory  Visual    Problem Solving/Reasoning (Money/Math, Safety , Multistep )     Time Management  Reviewed with examples from pt calendar. Phase 1-8, 9   Planning/Organization (Executive Functioning)  Continued discussion on goal stating and 'written commitments\", planning when using a planner.    Symptom  Management  Attention Fatigue Scale: 3 or less    PSFS: goal: 80%   Pt states often finds self to be in 4-6, at times higher.  4, 5, DC'd task before therapist could recommend a break.    Education/ Strategy Training Provided attention packet and attention fatigue scale.  Encouraged to bring folder back each session.    Other    Reviewed sleep routines (11-7) and target goals, meal routines, water recommendations.     APT Tracking    Sustained attention  Date 9/16/19 "     Tasks              Ascending # (aud) 72% 3 effort  fast     Matching clocks (vis) 73% 5 effort  fast

## 2019-09-24 ENCOUNTER — HOSPITAL ENCOUNTER (OUTPATIENT)
Dept: SPEECH THERAPY | Facility: REHABILITATION | Age: 49
Setting detail: THERAPIES SERIES
Discharge: HOME | End: 2019-09-24
Attending: PSYCHIATRY & NEUROLOGY
Payer: COMMERCIAL

## 2019-09-24 DIAGNOSIS — G31.84 MILD COGNITIVE IMPAIRMENT: Primary | ICD-10-CM

## 2019-09-24 PROCEDURE — 92507 TX SP LANG VOICE COMM INDIV: CPT | Mod: GN

## 2019-09-24 NOTE — OP SLP TREATMENT LOG
COGNITION SLP FLOW SHEET    SKILL AREA Progress CURRENT SESSION   SPEECH THERAPY: COGNITION     Attention  Moderate to complex sustained attention:  Rate 1-2 on:  Concentrating for long periods of time  Mind wanders more easily  Getting mentally tired more easily    Complete in distraction   Oriented to 5 types of attention, provided packet.  Introduced modification strategies for Difficulty, familiarity, enjoyment   Reviewed from notebook 5 types of attention- continues to require reinforcement   Orientation     Memory  Memory planner-12 phases- adapted to pt    Immediate memory:   Rep of complex sentences-90% of trials  Paragraph retell 80% of details    WM:Moderate  Mental calc  Mental manip    30+ min of delayed recall  Mental Manipulation 7/11, fatigue due to internal distraction  +++-+++  +---, pt became tearful due to frustration.  Task Dc'd.      Pt continues to practice use of dailies.  Requires max encouragement to use dailies to schedule     Repeat sentences:   Simple:  ++  Moderate:  ++-++  Complex:   +----   Processing APT baseline:   Sustained: See below  Auditory  Visual    Problem Solving/Reasoning (Money/Math, Safety , Multistep )     Time Management  Completed Time management orientation with phases 9-12.  Continue to monitor and manage with fading cues.     Planning/Organization (Executive Functioning)  Continued discussion on EF dividing up lengthy tasks and scheduling them into planner.    Symptom  Management  Attention Fatigue Scale: 3 or less    PSFS: goal: 80% 4, 5, DC'd task before therapist could recommend a break.   Pt states often finds self to be in 4-6, at times higher.  3    Education/ Strategy Training Provided attention packet and attention fatigue scale.  Encouraged to bring folder back each session.    Other Reviewed sleep routines (11-7) and target goals, meal routines, water recommendations.     F/u Dr. Mcdermott?  November 1, part time- consider daily, decreased hours.     APT  Tracking    Sustained attention  Date 9/16/19     Tasks              Ascending # (aud) 72% 3 effort  fast     Matching clocks (vis) 73% 5 effort  fast

## 2019-09-25 NOTE — PROGRESS NOTES
SLP DAILY NOTE FOR OUTPATIENT THERAPY    Patient: Sarah Beth Person   MRN: [id number]  : 1970 49 y.o.  Referring Physician: Maynor Mcdermott MD  Date of Visit: 2019      Certification Dates: 09/10/19 through 19    Diagnosis:   1. Mild cognitive impairment        Chief Complaints:   Chief Complaint   Patient presents with   • Cognition     Memory, attention       Precautions:      TODAY'S VISIT:          General Information - 19 1208        Session Details    Document Type daily treatment    Mode of Treatment individual therapy;speech language pathology    Patient/Family Observations Brought planner and new folder.        Time Calculation    Start Time 1200    Stop Time 1300    Time Calculation (min) 60 min              Pain and Vitals - 19 1207        Pain/Comfort/Sleep    Presence of Pain denies       Pain Interventions    Intervention none    Post Intervention Comments none              Daily Falls Screen - 19 1208        Daily Falls Assessment    Patient reported fall since last visit No              Daily Treatment Assessment and Plan - 19        Daily Treatment Assessment and Plan    Progress toward goals Progressing    Daily Outcome Summary Pt continues to benefit from awareness exercises to understand deficits in attention leading to decreased memory.     Plan and Recommendations practice sustained attention with increasing complexity.           OBJECTIVE MEASUREMENTS TAKEN TODAY:    None Taken    Today's Treatment:      COGNITION SLP FLOW SHEET    SKILL AREA Progress CURRENT SESSION   SPEECH THERAPY: COGNITION     Attention  Moderate to complex sustained attention:  Rate 1-2 on:  Concentrating for long periods of time  Mind wanders more easily  Getting mentally tired more easily    Complete in distraction   Oriented to 5 types of attention, provided packet.  Introduced modification strategies for Difficulty, familiarity, enjoyment   Reviewed from notebook 5  types of attention- continues to require reinforcement   Orientation     Memory  Memory planner-12 phases- adapted to pt    Immediate memory:   Rep of complex sentences-90% of trials  Paragraph retell 80% of details    WM:Moderate  Mental calc  Mental manip    30+ min of delayed recall  Mental Manipulation 7/11, fatigue due to internal distraction  +++-+++  +---, pt became tearful due to frustration.  Task Dc'd.      Pt continues to practice use of dailies.  Requires max encouragement to use dailies to schedule     Repeat sentences:   Simple:  ++  Moderate:  ++-++  Complex:   +----   Processing APT baseline:   Sustained: See below  Auditory  Visual    Problem Solving/Reasoning (Money/Math, Safety , Multistep )     Time Management  Completed Time management orientation with phases 9-12.  Continue to monitor and manage with fading cues.     Planning/Organization (Executive Functioning)  Continued discussion on EF dividing up lengthy tasks and scheduling them into planner.    Symptom  Management  Attention Fatigue Scale: 3 or less    PSFS: goal: 80% 4, 5, DC'd task before therapist could recommend a break.   Pt states often finds self to be in 4-6, at times higher.  3    Education/ Strategy Training Provided attention packet and attention fatigue scale.  Encouraged to bring folder back each session.    Other Reviewed sleep routines (11-7) and target goals, meal routines, water recommendations.     F/u Dr. Mcdermott?  November 1, part time- consider daily, decreased hours.     APT Tracking    Sustained attention  Date 9/16/19     Tasks              Ascending # (aud) 72% 3 effort  fast     Matching clocks (vis) 73% 5 effort  fast

## 2019-10-01 ENCOUNTER — HOSPITAL ENCOUNTER (OUTPATIENT)
Dept: SPEECH THERAPY | Facility: REHABILITATION | Age: 49
Setting detail: THERAPIES SERIES
Discharge: HOME | End: 2019-10-01
Attending: PSYCHIATRY & NEUROLOGY
Payer: COMMERCIAL

## 2019-10-01 DIAGNOSIS — G31.84 MILD COGNITIVE IMPAIRMENT: Primary | ICD-10-CM

## 2019-10-01 PROCEDURE — 92507 TX SP LANG VOICE COMM INDIV: CPT | Mod: GN

## 2019-10-01 NOTE — PROGRESS NOTES
SLP DAILY NOTE FOR OUTPATIENT THERAPY    Patient: Sarah Beth Person   MRN: [id number]  : 1970 49 y.o.  Referring Physician: Maynor Mcdermott MD  Date of Visit: 10/1/2019      Certification Dates: 09/10/19 through 19    Diagnosis:   1. Mild cognitive impairment        Chief Complaints:   Chief Complaint   Patient presents with   • Cognition     memory       Precautions:      TODAY'S VISIT:          General Information - 10/01/19 1208        Session Details    Document Type daily treatment    Mode of Treatment individual therapy;speech language pathology    Patient/Family Observations Pt expressed that constant worry about son results in impact on selective attention, and is impacting sleep.  Resting 11-7       Time Calculation    Start Time 1200    Stop Time 1300    Time Calculation (min) 60 min              Pain and Vitals - 10/01/19 1207        Pain/Comfort/Sleep    Presence of Pain denies       Pain Interventions    Intervention none    Post Intervention Comments none                Daily Treatment Assessment and Plan - 10/01/19 1237        Daily Treatment Assessment and Plan    Progress toward goals Progressing    Daily Outcome Summary Improved APT scores with decreased rate of presentation.     Plan and Recommendations APT sustained FAST, increase to alternating attention.  Complex sustained attention in distraction          OBJECTIVE MEASUREMENTS TAKEN TODAY:    None Taken    Today's Treatment:      COGNITION SLP FLOW SHEET    SKILL AREA Progress CURRENT SESSION   SPEECH THERAPY: COGNITION     Attention  Moderate to complex sustained attention:  Rate 1-2 on:  Concentrating for long periods of time  Mind wanders more easily  Getting mentally tired more easily    Complete in distraction   Oriented to 5 types of attention, provided packet.  Introduced modification strategies for Difficulty, familiarity, enjoyment   Reviewed from notebook 5 types of attention- continues to require reinforcement    Orientation     Memory  Memory planner-12 phases- adapted to pt    Immediate memory:   Rep of complex sentences-90% of trials  Paragraph retell 80% of details    WM:Moderate  Mental calc  Mental manip    30+ min of delayed recall  Mental Manipulation 7/11, fatigue due to internal distraction  +++-+++  +---, pt became tearful due to frustration.  Task Dc'd.     Repeat sentences:   Simple:  ++  Moderate:  ++-++  Complex:   +----           Processing APT baseline:   Sustained: See below  Auditory  Visual APT: decreased speed resulted in 100%. See belw   Problem Solving/Reasoning (Money/Math, Safety , Multistep )     Time Management Completed Time management orientation with phases 9-12.  Continue to monitor and manage with fading cues.      Planning/Organization (Executive Functioning)  Visual processing bood: p 189. Ex. 36 Maps  Anticipated task to be a 2- simple to moderate.  Completed 1 map task in 5 minutes, found error and demonstrated difficulty self correcting error.     Symptom  Management  Attention Fatigue Scale: 3 or less    PSFS: goal: 80% 4, 5, DC'd task before therapist could recommend a break.   Pt states often finds self to be in 4-6, at times higher.  3    Education/ Strategy Training Provided attention packet and attention fatigue scale.  Encouraged to bring folder back each session.    Other Reviewed sleep routines (11-7) and target goals, meal routines, water recommendations.     F/u Dr. Mcdermott?  November 1, part time- consider daily, decreased hours.     APT Tracking    Sustained attention  Date 9/16/19 10/1/2019 planned   Tasks              Ascending # (aud) 72% 3 effort  fast 100% 6 effort  slow Planned fast   Matching clocks (vis) 73% 5 effort  fast 100% 7 effort  slow Planned fast

## 2019-10-01 NOTE — OP SLP TREATMENT LOG
COGNITION SLP FLOW SHEET    SKILL AREA Progress CURRENT SESSION   SPEECH THERAPY: COGNITION     Attention  Moderate to complex sustained attention:  Rate 1-2 on:  Concentrating for long periods of time  Mind wanders more easily  Getting mentally tired more easily    Complete in distraction   Oriented to 5 types of attention, provided packet.  Introduced modification strategies for Difficulty, familiarity, enjoyment   Reviewed from notebook 5 types of attention- continues to require reinforcement   Orientation     Memory  Memory planner-12 phases- adapted to pt    Immediate memory:   Rep of complex sentences-90% of trials  Paragraph retell 80% of details    WM:Moderate  Mental calc  Mental manip    30+ min of delayed recall  Mental Manipulation 7/11, fatigue due to internal distraction  +++-+++  +---, pt became tearful due to frustration.  Task Dc'd.     Repeat sentences:   Simple:  ++  Moderate:  ++-++  Complex:   +----           Processing APT baseline:   Sustained: See below  Auditory  Visual APT: decreased speed resulted in 100%. See belw   Problem Solving/Reasoning (Money/Math, Safety , Multistep )     Time Management Completed Time management orientation with phases 9-12.  Continue to monitor and manage with fading cues.      Planning/Organization (Executive Functioning)  Visual processing bood: p 189. Ex. 36 Maps  Anticipated task to be a 2- simple to moderate.  Completed 1 map task in 5 minutes, found error and demonstrated difficulty self correcting error.     Symptom  Management  Attention Fatigue Scale: 3 or less    PSFS: goal: 80% 4, 5, DC'd task before therapist could recommend a break.   Pt states often finds self to be in 4-6, at times higher.  3    Education/ Strategy Training Provided attention packet and attention fatigue scale.  Encouraged to bring folder back each session.    Other Reviewed sleep routines (11-7) and target goals, meal routines, water recommendations.     F/u Dr. Mcdermott?  November  1, part time- consider daily, decreased hours.     APT Tracking    Sustained attention  Date 9/16/19 10/1/2019 planned   Tasks              Ascending # (aud) 72% 3 effort  fast 100% 6 effort  slow Planned fast   Matching clocks (vis) 73% 5 effort  fast 100% 7 effort  slow Planned fast

## 2019-10-03 ENCOUNTER — HOSPITAL ENCOUNTER (OUTPATIENT)
Dept: SPEECH THERAPY | Facility: REHABILITATION | Age: 49
Setting detail: THERAPIES SERIES
Discharge: HOME | End: 2019-10-03
Attending: PSYCHIATRY & NEUROLOGY
Payer: COMMERCIAL

## 2019-10-03 DIAGNOSIS — G31.84 MILD COGNITIVE IMPAIRMENT: Primary | ICD-10-CM

## 2019-10-03 PROCEDURE — 92507 TX SP LANG VOICE COMM INDIV: CPT | Mod: GN

## 2019-10-03 NOTE — OP SLP TREATMENT LOG
COGNITION SLP FLOW SHEET    SKILL AREA Progress CURRENT SESSION   SPEECH THERAPY: COGNITION     Attention  Moderate to complex sustained attention:  Rate 1-2 on:  Concentrating for long periods of time  Mind wanders more easily  Getting mentally tired more easily    Complete in distraction   Oriented to 5 types of attention, provided packet.  Introduced modification strategies for Difficulty, familiarity, enjoyment   Max A to label attention task for CT tasks this date.    Orientation     Memory  Memory planner-12 phases- adapted to pt    Immediate memory:   Rep of complex sentences-90% of trials  Paragraph retell 80% of details    WM:Moderate  Mental calc  Mental manip    30+ min of delayed recall  Mental Manipulation 7/11, fatigue due to internal distraction  +++-+++  +---, pt became tearful due to frustration.  Task Dc'd.     Repeat sentences:   Simple:  ++  Moderate:  ++-++  Complex:   +----    auditory memory:  Repeat number sequences L3/5: 84% accuracy, 32%tile latency    Visual memory:  Pattern Recreation L6/12: 71%tile accuracy, 17%tile latency     Processing APT baseline:   Sustained: See below  Auditory  Visual APT: increased to FAST, see below  Flanker L1/1: 100% accuracy, 68%tile latency  Playing Slapjack L3/3: 100% accuracy, 26 %tile latency  Symbol matching L10/10: 100% accuracy, 64 %tile latency  Picture N-Back Memory L2/3: 73% accuracy, 35 %tile latency   Problem Solving/Reasoning (Money/Math, Safety , Multistep )     Time Management Completed Time management orientation with phases 9-12.  Continue to monitor and manage with fading cues.      Planning/Organization (Executive Functioning) Visual processing bood: p 189. Ex. 36 Maps  Anticipated task to be a 2- simple to moderate.  Completed 1 map task in 5 minutes, found error and demonstrated difficulty self correcting error.      Symptom  Management  Attention Fatigue Scale: 3 or less    PSFS: goal: 80% 4, 5, DC'd task before therapist could  recommend a break.   Pt states often finds self to be in 4-6, at times higher.     Education/ Strategy Training Provided attention packet and attention fatigue scale.  Pt requesting processing exercises.  CT login: [email or web address]  PW: [email or web address]    Assigned Organized Mind to listen to- answer questions upon return.    Other Reviewed sleep routines (11-7) and target goals, meal routines, water recommendations.     F/u Dr. Mcdermott?  November 1, part time- consider daily, decreased hours.     APT Tracking    Sustained attention  Date 9/16/19 10/1/2019 planned   Tasks              Ascending # (aud) 72% 3 effort  fast 100% 6 effort  slow 71% 7 effort  fast   Matching clocks (vis) 73% 5 effort  fast 100% 7 effort  slow 94% 8 effort fast           Search for processing games.

## 2019-10-03 NOTE — PROGRESS NOTES
SLP DAILY NOTE FOR OUTPATIENT THERAPY    Patient: Sarah Beth Person   MRN: [id number]  : 1970 49 y.o.  Referring Physician: Maynor Mcdermott MD  Date of Visit: 10/3/2019      Certification Dates: 09/10/19 through 19    Diagnosis:   1. Mild cognitive impairment        Chief Complaints:   Chief Complaint   Patient presents with   • Cognition     feels overwhelmed all the time.        Precautions:      TODAY'S VISIT:          General Information - 10/03/19 1310        Session Details    Document Type daily treatment    Mode of Treatment individual therapy;speech language pathology       Time Calculation    Start Time 1300    Stop Time 1400    Time Calculation (min) 60 min              Pain and Vitals - 10/03/19 1309        Pain/Comfort/Sleep    Presence of Pain denies       Pain Interventions    Intervention none    Post Intervention Comments none              Daily Falls Screen - 10/03/19 1311        Daily Falls Assessment    Patient reported fall since last visit No              Daily Treatment Assessment and Plan - 10/03/19 1616        Daily Treatment Assessment and Plan    Progress toward goals Progressing    Daily Outcome Summary improved processing as demonstrated on APT.  Continue to practice processing speed.      Plan and Recommendations Complex sustained attention in distraction.  Alternating attention pairing. Review questions about Organized Mind.           OBJECTIVE MEASUREMENTS TAKEN TODAY:    None Taken    Today's Treatment:      COGNITION SLP FLOW SHEET    SKILL AREA Progress CURRENT SESSION   SPEECH THERAPY: COGNITION     Attention  Moderate to complex sustained attention:  Rate 1-2 on:  Concentrating for long periods of time  Mind wanders more easily  Getting mentally tired more easily    Complete in distraction   Oriented to 5 types of attention, provided packet.  Introduced modification strategies for Difficulty, familiarity, enjoyment   Max A to label attention task for CT tasks this  date.    Orientation     Memory  Memory planner-12 phases- adapted to pt    Immediate memory:   Rep of complex sentences-90% of trials  Paragraph retell 80% of details    WM:Moderate  Mental calc  Mental manip    30+ min of delayed recall  Mental Manipulation 7/11, fatigue due to internal distraction  +++-+++  +---, pt became tearful due to frustration.  Task Dc'd.     Repeat sentences:   Simple:  ++  Moderate:  ++-++  Complex:   +----    auditory memory:  Repeat number sequences L3/5: 84% accuracy, 32%tile latency    Visual memory:  Pattern Recreation L6/12: 71%tile accuracy, 17%tile latency     Processing APT baseline:   Sustained: See below  Auditory  Visual APT: increased to FAST, see below  Flanker L1/1: 100% accuracy, 68%tile latency  Playing Slapjack L3/3: 100% accuracy, 26 %tile latency  Symbol matching L10/10: 100% accuracy, 64 %tile latency  Picture N-Back Memory L2/3: 73% accuracy, 35 %tile latency   Problem Solving/Reasoning (Money/Math, Safety , Multistep )     Time Management Completed Time management orientation with phases 9-12.  Continue to monitor and manage with fading cues.      Planning/Organization (Executive Functioning) Visual processing bood: p 189. Ex. 36 Maps  Anticipated task to be a 2- simple to moderate.  Completed 1 map task in 5 minutes, found error and demonstrated difficulty self correcting error.      Symptom  Management  Attention Fatigue Scale: 3 or less    PSFS: goal: 80% 4, 5, DC'd task before therapist could recommend a break.   Pt states often finds self to be in 4-6, at times higher.     Education/ Strategy Training Provided attention packet and attention fatigue scale.  Pt requesting processing exercises.  CT login: [email or web address]  PW: [email or web address]    Assigned Organized Mind to listen to- answer questions upon return.    Other Reviewed sleep routines (11-7) and target goals, meal routines, water recommendations.     F/u Dr. Mcdermott?  November 1, part time- consider daily, decreased hours.      APT Tracking    Sustained attention  Date 9/16/19 10/1/2019 planned   Tasks              Ascending # (aud) 72% 3 effort  fast 100% 6 effort  slow 71% 7 effort  fast   Matching clocks (vis) 73% 5 effort  fast 100% 7 effort  slow 94% 8 effort fast           Search for processing games.

## 2019-10-08 ENCOUNTER — HOSPITAL ENCOUNTER (OUTPATIENT)
Dept: SPEECH THERAPY | Facility: REHABILITATION | Age: 49
Setting detail: THERAPIES SERIES
Discharge: HOME | End: 2019-10-08
Attending: PSYCHIATRY & NEUROLOGY
Payer: COMMERCIAL

## 2019-10-08 DIAGNOSIS — G31.84 MILD COGNITIVE IMPAIRMENT: Primary | ICD-10-CM

## 2019-10-08 PROCEDURE — 92507 TX SP LANG VOICE COMM INDIV: CPT | Mod: GN

## 2019-10-10 ENCOUNTER — HOSPITAL ENCOUNTER (OUTPATIENT)
Dept: SPEECH THERAPY | Facility: REHABILITATION | Age: 49
Setting detail: THERAPIES SERIES
Discharge: HOME | End: 2019-10-10
Attending: PSYCHIATRY & NEUROLOGY
Payer: COMMERCIAL

## 2019-10-10 DIAGNOSIS — G31.84 MILD COGNITIVE IMPAIRMENT: Primary | ICD-10-CM

## 2019-10-10 PROCEDURE — 92507 TX SP LANG VOICE COMM INDIV: CPT | Mod: GN

## 2019-10-10 NOTE — OP SLP TREATMENT LOG
COGNITION SLP FLOW SHEET    SKILL AREA Progress CURRENT SESSION   SPEECH THERAPY: COGNITION     Attention  Moderate to complex sustained attention:  Rate 1-2 on:  Concentrating for long periods of time  Mind wanders more easily  Getting mentally tired more easily    Complete in distraction   Oriented to 5 types of attention, provided packet.  Introduced modification strategies for Difficulty, familiarity, enjoyment Auditory attention- functional memory book, p 252  First trial- introduction 5/8  Second trial- 6/8  3rd trial- 8/8    Increased internal distractions, tearful.     Orientation     Memory  Memory planner-12 phases- adapted to pt    Immediate memory:   Rep of complex sentences-90% of trials  Paragraph retell 80% of details    WM:Moderate  Mental calc  Mental manip    30+ min of delayed recall  Mental Manipulation 7/11, fatigue due to internal distraction  +++-+++  +---, pt became tearful due to frustration.  Task Dc'd.     Repeat sentences:   Simple:  ++  Moderate:  ++-++  Complex:   +----  auditory memory:  Repeat number sequences L3/5: 84% accuracy, 32%tile latency    Visual memory:  Pattern Recreation L6/12: 71%tile accuracy, 17%tile latency       Processing Flanker L1/1: 100% accuracy, 68%tile latency  Playing Slapjack L3/3: 100% accuracy, 26 %tile latency  Symbol matching L10/10: 100% accuracy, 64 %tile latency  Picture N-Back Memory L2/3: 73% accuracy, 35 %tile latency   Decreased auditory attention results in decreased ability to process memory connections.    Problem Solving/Reasoning (Money/Math, Safety , Multistep )     Time Management Completed Time management orientation with phases 9-12.  Continue to monitor and manage with fading cues.      Planning/Organization (Executive Functioning) Visual processing bood: p 189. Ex. 36 Maps  Anticipated task to be a 2- simple to moderate.  Completed 1 map task in 5 minutes, found error and demonstrated difficulty self correcting error.   Continue  "discussion and practice for goal setting, time estimations,initiation and perseverence.  Pt reports she continues to be inhibited from completing home tasks, unable to initiate, finds herself sitting on the sofa watching \"Friends.\"   Symptom  Management  Attention Fatigue Scale: 3 or less    PSFS: goal: 80% 4, 5, DC'd task before therapist could recommend a break.   Pt states often finds self to be in 4-6, at times higher.  4-6, increased due to internal distractions. Unable to demonstrate auditory sustained attention.    Education/ Strategy Training Provided attention packet and attention fatigue scale.  Pt requesting processing exercises.  CT login: [email or web address]  PW: [email or web address]    Assigned Organized Mind to listen to- answer questions upon return. Did not complete   Other Reviewed sleep routines (11-7) and target goals, meal routines, water recommendations.     F/u Dr. Mcdermott?  November 1, part time- consider daily, decreased hours.     APT Tracking    Sustained attention  Date 9/16/19 10/1/2019 10/8/2019   Tasks              Ascending # (aud) 72% 3 effort  fast 100% 6 effort  slow 71% 7 effort  fast   Matching clocks (vis) 73% 5 effort  fast 100% 7 effort  slow 94% 8 effort fast           Search for processing games.      "

## 2019-10-10 NOTE — OP SLP TREATMENT LOG
COGNITION SLP FLOW SHEET    SKILL AREA Progress CURRENT SESSION   SPEECH THERAPY: COGNITION     Attention  Moderate to complex sustained attention:  Rate 1-2 on:  Concentrating for long periods of time  Mind wanders more easily  Getting mentally tired more easily    Complete in distraction   Oriented to 5 types of attention, provided packet.  Introduced modification strategies for Difficulty, familiarity, enjoyment   Max A to label attention task for CT tasks this date.    Orientation     Memory  Memory planner-12 phases- adapted to pt    Immediate memory:   Rep of complex sentences-90% of trials  Paragraph retell 80% of details    WM:Moderate  Mental calc  Mental manip    30+ min of delayed recall  Mental Manipulation 7/11, fatigue due to internal distraction  +++-+++  +---, pt became tearful due to frustration.  Task Dc'd.     Repeat sentences:   Simple:  ++  Moderate:  ++-++  Complex:   +----    auditory memory:  Repeat number sequences L3/5: 84% accuracy, 32%tile latency    Visual memory:  Pattern Recreation L6/12: 71%tile accuracy, 17%tile latency     Processing Flanker L1/1: 100% accuracy, 68%tile latency  Playing Slapjack L3/3: 100% accuracy, 26 %tile latency  Symbol matching L10/10: 100% accuracy, 64 %tile latency  Picture N-Back Memory L2/3: 73% accuracy, 35 %tile latency      Problem Solving/Reasoning (Money/Math, Safety , Multistep )     Time Management Completed Time management orientation with phases 9-12.  Continue to monitor and manage with fading cues.      Planning/Organization (Executive Functioning) Visual processing bood: p 189. Ex. 36 Maps  Anticipated task to be a 2- simple to moderate.  Completed 1 map task in 5 minutes, found error and demonstrated difficulty self correcting error.      Symptom  Management  Attention Fatigue Scale: 3 or less    PSFS: goal: 80% 4, 5, DC'd task before therapist could recommend a break.   Pt states often finds self to be in 4-6, at times higher.     Education/  Strategy Training Provided attention packet and attention fatigue scale.  Pt requesting processing exercises.  CT login: [email or web address]  PW: [email or web address]    Assigned Organized Mind to listen to- answer questions upon return.    Other Reviewed sleep routines (11-7) and target goals, meal routines, water recommendations.     F/u Dr. Mcdermott?  November 1, part time- consider daily, decreased hours.     APT Tracking    Sustained attention  Date 9/16/19 10/1/2019 10/8/2019   Tasks              Ascending # (aud) 72% 3 effort  fast 100% 6 effort  slow 71% 7 effort  fast   Matching clocks (vis) 73% 5 effort  fast 100% 7 effort  slow 94% 8 effort fast           Search for processing games.

## 2019-10-10 NOTE — PROGRESS NOTES
"SLP PROGRESS NOTE FOR OUTPATIENT THERAPY    Patient: Sarah Beth Person   MRN: [id number]  : 1970 49 y.o.  Referring Physician: Maynor Mcdermott MD  Date of Visit: 10/8/2019      Certification Dates: 09/10/19 through 19    Recommended Frequency & Duration:  2 times/week for up to 8 weeks     Diagnosis:   1. Mild cognitive impairment        Chief Complaints:   Chief Complaint   Patient presents with   • Cognitive Dysfunction     emotionally driven cognitive        Precautions:      TODAY'S VISIT:          General Information - 10/08/19 1023        Session Details    Document Type other (see comments)   progress note    Mode of Treatment individual therapy;speech language pathology    Patient/Family Observations Continued discussion on emotionally driven \"Static\" distracting cognitive abilities.        Time Calculation    Start Time 1300    Stop Time 1400    Time Calculation (min) 60 min              Pain and Vitals - 10/08/19 1023        Pain/Comfort/Sleep    Presence of Pain denies       Pain Interventions    Intervention none    Post Intervention Comments none              Daily Falls Screen - 10/08/19 1026        Daily Falls Assessment    Patient reported fall since last visit No              Daily Treatment Assessment and Plan - 10/08/19 0916        Daily Treatment Assessment and Plan    Progress toward goals Progressing    Daily Outcome Summary Extend focus on Auditory Processing.     Plan and Recommendations Auditory attention for sentences, paragraph, increase complexity          OBJECTIVE MEASUREMENTS/DATA:    None Taken    Today's Treatment::      COGNITION SLP FLOW SHEET    SKILL AREA Progress CURRENT SESSION   SPEECH THERAPY: COGNITION     Attention  Moderate to complex sustained attention:  Rate 1-2 on:  Concentrating for long periods of time  Mind wanders more easily  Getting mentally tired more easily    Complete in distraction   Oriented to 5 types of attention, provided packet.  Introduced " modification strategies for Difficulty, familiarity, enjoyment   Max A to label attention task for CT tasks this date.    Orientation     Memory  Memory planner-12 phases- adapted to pt    Immediate memory:   Rep of complex sentences-90% of trials  Paragraph retell 80% of details    WM:Moderate  Mental calc  Mental manip    30+ min of delayed recall  Mental Manipulation 7/11, fatigue due to internal distraction  +++-+++  +---, pt became tearful due to frustration.  Task Dc'd.     Repeat sentences:   Simple:  ++  Moderate:  ++-++  Complex:   +----    auditory memory:  Repeat number sequences L3/5: 84% accuracy, 32%tile latency    Visual memory:  Pattern Recreation L6/12: 71%tile accuracy, 17%tile latency     Processing Flanker L1/1: 100% accuracy, 68%tile latency  Playing Slapjack L3/3: 100% accuracy, 26 %tile latency  Symbol matching L10/10: 100% accuracy, 64 %tile latency  Picture N-Back Memory L2/3: 73% accuracy, 35 %tile latency      Problem Solving/Reasoning (Money/Math, Safety , Multistep )     Time Management Completed Time management orientation with phases 9-12.  Continue to monitor and manage with fading cues.      Planning/Organization (Executive Functioning) Visual processing bood: p 189. Ex. 36 Maps  Anticipated task to be a 2- simple to moderate.  Completed 1 map task in 5 minutes, found error and demonstrated difficulty self correcting error.      Symptom  Management  Attention Fatigue Scale: 3 or less    PSFS: goal: 80% 4, 5, DC'd task before therapist could recommend a break.   Pt states often finds self to be in 4-6, at times higher.     Education/ Strategy Training Provided attention packet and attention fatigue scale.  Pt requesting processing exercises.  CT login: [email or web address]  PW: [email or web address]    Assigned Organized Mind to listen to- answer questions upon return.    Other Reviewed sleep routines (11-7) and target goals, meal routines, water recommendations.     F/u Dr. Mcdermott?  November 1, part time- consider  daily, decreased hours.     APT Tracking    Sustained attention  Date 9/16/19 10/1/2019 10/8/2019   Tasks              Ascending # (aud) 72% 3 effort  fast 100% 6 effort  slow 71% 7 effort  fast   Matching clocks (vis) 73% 5 effort  fast 100% 7 effort  slow 94% 8 effort fast           Search for processing games.          Goals Addressed     • Cognition                   Sustained attention  Date 9/16/19 10/1/2019 10/8/2019   Tasks              Ascending # (aud) 72% 3 effort  fast 100% 6 effort  slow 71% 7 effort  fast   Matching clocks (vis) 73% 5 effort  fast 100% 7 effort  slow 94% 8 effort fast        Goals Short-Long Time Frame Result Comment   Pt will demonstrate improved NOMS scores in:  Attention-6  Memory-7  Problem Solving-7    Pt will improve PSFS to >/=80%     Pt will report improved self-rating scores on the:  Rating scale for attention: from moderate to mild.     Rating score for processing from Mild to WNL     Pt will demonstrate carryover of HEP    LTG                    LTG            LTG          LTG 12 w                    12 w            12 w   Baseline:   Attention:5  Memory:5  Problem Solvin            Baseline: 53%  Current: 73%      Baseline: Attention Rating Scale: 2.9  Scores re-evaluated in focused and sustained attention: focus remained at moderate 3.0, sustained increased in severity from 2.8-3.3       Baseline: Processing Rating Scale: mild   EDUCATION:  Pt will identify cognitive demands with level of attention required provided written handout and review, 80% of trials, supervision     Pt will report maintenance of a 3 or less over 2 weeks on the attention fatigue rating scale.      STG              STG    3 w              8 w      50% without handout, Supervision.                   MEMORY:  Pt will adapt a version of 12 phases of time management to improve memory of daily/weekly/monthly scheduled events, as support planning and execution for home tasks, supervision.     Pt will  improve immediate memory for  repetition of complex sentences, 90% of trials, paragraph retell, 80% of detail inclusion.      Pt will demonstrate WM for mental calculations and manipulation tasks for moderate tasks, 90% of trials     Pt will demonstrate 30+ minutes delayed recall of named words or activities 90% of trials. STG                           STG                STG  4 w                         6 w                 8 w    Address when return to work.     ATTENTION:  Pt will complete moderate to complex sustained attention task and report 1-2 on: concentrating for a long period of time, Finding attention wanders more easily, getting mentally tired more easily, supervision    Pt will complete the above goal in distraction, supervision STG 6w       PROCESSING:   Pt will complete activities for improved auditory processing as demonstrated by improvement in APT ascending fast to >/=85% and self report.   STG 8 w   Baseline: auditory sustained:  Ascending (fast): 72%  Current: auditory sustained:  Ascending (fast): 71%                                                                    Clinical Impression: Ms. Person demonstrates improved awareness and understanding of cognitive changes and their relationship to stress at this time.  Ms. Person would benefit from continued practice in skills for improved sustained attention provided practice in metacognition and managing environment.  Ms. Person and therapist will discuss possible changes in POC for support with return to work.

## 2019-10-11 NOTE — PROGRESS NOTES
SLP DAILY NOTE FOR OUTPATIENT THERAPY    Patient: Sarah Beth Person   MRN: [id number]  : 1970 49 y.o.  Referring Physician: Maynor Mcdermott MD  Date of Visit: 10/10/2019      Certification Dates: 09/10/19 through 19    Diagnosis:   1. Mild cognitive impairment        Chief Complaints:   Chief Complaint   Patient presents with   • Cognition     shared memory journal.         Precautions:      TODAY'S VISIT:          General Information - 10/10/19 1015        Session Details    Document Type daily treatment    Mode of Treatment individual therapy;speech language pathology    Patient/Family Observations planner and journal bought to session.  Not using planner to full potential.  Using journal for situational memory, episodic memory and overall retrospective recall.         Time Calculation    Start Time 1000    Stop Time 1100    Time Calculation (min) 60 min              Pain and Vitals - 10/10/19 1013        Pain/Comfort/Sleep    Presence of Pain denies       Pain Interventions    Intervention none    Post Intervention Comments none              Daily Falls Screen - 10/10/19 1016        Daily Falls Assessment    Patient reported fall since last visit No              Daily Treatment Assessment and Plan - 10/10/19 1416        Daily Treatment Assessment and Plan    Progress toward goals Progressing    Daily Outcome Summary Pt overwhelmed with internal distractions.  Dependent to practice processing and memory for moderate level sustained auditory attention task.     Plan and Recommendations Auditory attention for sentences, paragraph, increase complexity, manage internal distractions, task initiation.           OBJECTIVE MEASUREMENTS TAKEN TODAY:    None Taken    Today's Treatment:      COGNITION SLP FLOW SHEET    SKILL AREA Progress CURRENT SESSION   SPEECH THERAPY: COGNITION     Attention  Moderate to complex sustained attention:  Rate 1-2 on:  Concentrating for long periods of time  Mind wanders more  easily  Getting mentally tired more easily    Complete in distraction   Oriented to 5 types of attention, provided packet.  Introduced modification strategies for Difficulty, familiarity, enjoyment Auditory attention- functional memory book, p 252  First trial- introduction 5/8  Second trial- 6/8  3rd trial- 8/8    Increased internal distractions, tearful.     Orientation     Memory  Memory planner-12 phases- adapted to pt    Immediate memory:   Rep of complex sentences-90% of trials  Paragraph retell 80% of details    WM:Moderate  Mental calc  Mental manip    30+ min of delayed recall  Mental Manipulation 7/11, fatigue due to internal distraction  +++-+++  +---, pt became tearful due to frustration.  Task Dc'd.     Repeat sentences:   Simple:  ++  Moderate:  ++-++  Complex:   +----  auditory memory:  Repeat number sequences L3/5: 84% accuracy, 32%tile latency    Visual memory:  Pattern Recreation L6/12: 71%tile accuracy, 17%tile latency       Processing Flanker L1/1: 100% accuracy, 68%tile latency  Playing Slapjack L3/3: 100% accuracy, 26 %tile latency  Symbol matching L10/10: 100% accuracy, 64 %tile latency  Picture N-Back Memory L2/3: 73% accuracy, 35 %tile latency   Decreased auditory attention results in decreased ability to process memory connections.    Problem Solving/Reasoning (Money/Math, Safety , Multistep )     Time Management Completed Time management orientation with phases 9-12.  Continue to monitor and manage with fading cues.      Planning/Organization (Executive Functioning) Visual processing bood: p 189. Ex. 36 Maps  Anticipated task to be a 2- simple to moderate.  Completed 1 map task in 5 minutes, found error and demonstrated difficulty self correcting error.   Continue discussion and practice for goal setting, time estimations,initiation and perseverence.  Pt reports she continues to be inhibited from completing home tasks, unable to initiate, finds herself sitting on the sofa watching  "\"Friends.\"   Symptom  Management  Attention Fatigue Scale: 3 or less    PSFS: goal: 80% 4, 5, DC'd task before therapist could recommend a break.   Pt states often finds self to be in 4-6, at times higher.  4-6, increased due to internal distractions. Unable to demonstrate auditory sustained attention.    Education/ Strategy Training Provided attention packet and attention fatigue scale.  Pt requesting processing exercises.  CT login: [email or web address]  PW: [email or web address]    Assigned Organized Mind to listen to- answer questions upon return. Did not complete   Other Reviewed sleep routines (11-7) and target goals, meal routines, water recommendations.     F/u Dr. Mcdermott?  November 1, part time- consider daily, decreased hours.     APT Tracking    Sustained attention  Date 9/16/19 10/1/2019 10/8/2019   Tasks              Ascending # (aud) 72% 3 effort  fast 100% 6 effort  slow 71% 7 effort  fast   Matching clocks (vis) 73% 5 effort  fast 100% 7 effort  slow 94% 8 effort fast           Search for processing games.                                "

## 2019-10-15 ENCOUNTER — HOSPITAL ENCOUNTER (OUTPATIENT)
Dept: SPEECH THERAPY | Facility: REHABILITATION | Age: 49
Setting detail: THERAPIES SERIES
Discharge: HOME | End: 2019-10-15
Attending: PSYCHIATRY & NEUROLOGY
Payer: COMMERCIAL

## 2019-10-15 DIAGNOSIS — G31.84 MILD COGNITIVE IMPAIRMENT: Primary | ICD-10-CM

## 2019-10-15 PROCEDURE — 92507 TX SP LANG VOICE COMM INDIV: CPT

## 2019-10-15 NOTE — OP SLP TREATMENT LOG
COGNITION SLP FLOW SHEET    SKILL AREA Progress CURRENT SESSION   SPEECH THERAPY: COGNITION     Attention  Moderate to complex sustained attention:  Rate 1-2 on:  Concentrating for long periods of time  Mind wanders more easily  Getting mentally tired more easily    Complete in distraction   Oriented to 5 types of attention, provided packet.  Introduced modification strategies for Difficulty, familiarity, enjoyment    Auditory attention- functional memory book, p 252  First trial- introduction 5/8  Second trial- 6/8  3rd trial- 8/8    Increased internal distractions, tearful.   Auditory attention:  4 word sentences:  Fwd: 10/10 (I), Bwd 10/10 (I)    6 word sentences:  Fwd: 10/10 (I); Bwd 8/10 min A (use of visuals, chunking)    Visual attention:  4 blocks: 3/4 (I), 4/4 mod A  Extra time, second guessing self.     Orientation     Memory  Memory planner-12 phases- adapted to pt    Immediate memory:   Rep of complex sentences-90% of trials  Paragraph retell 80% of details    WM:Moderate  Mental calc  Mental manip    30+ min of delayed recall  Mental Manipulation 7/11, fatigue due to internal distraction  +++-+++  +---, pt became tearful due to frustration.  Task Dc'd.     Repeat sentences:   Simple:  ++  Moderate:  ++-++  Complex:   +----  auditory memory:  Repeat number sequences L3/5: 84% accuracy, 32%tile latency    Visual memory:  Pattern Recreation L6/12: 71%tile accuracy, 17%tile latency    See auditory and visual attention for memory above.    Processing Flanker L1/1: 100% accuracy, 68%tile latency  Playing Slapjack L3/3: 100% accuracy, 26 %tile latency  Symbol matching L10/10: 100% accuracy, 64 %tile latency  Picture N-Back Memory L2/3: 73% accuracy, 35 %tile latency   Decreased auditory attention results in decreased ability to process memory connections. Observed decreased processing on both visual and auditory tasks.    Problem Solving/Reasoning (Money/Math, Safety , Multistep )     Time Management Completed  Time management orientation with phases 9-12.  Continue to monitor and manage with fading cues.      Planning/Organization (Executive Functioning) Visual processing bood: p 189. Ex. 36 Maps  Anticipated task to be a 2- simple to moderate.  Completed 1 map task in 5 minutes, found error and demonstrated difficulty self correcting error.   Therapist requested pt write 3 distractions on 3 pieces of paper.  If distractions appeared during session and could not be suppressed, pt was advised to take 20-30 second break to write down distraction and return to task.  Pt was not distracted with 3 items during the session.  Advised to practice this strategy at home.    Symptom  Management  Attention Fatigue Scale: 3 or less    PSFS: goal: 80% 4, 5, DC'd task before therapist could recommend a break.   Pt states often finds self to be in 4-6, at times higher.  4-6, increased due to internal distractions. Unable to demonstrate auditory sustained attention with working memory task due to what appeared self doubt.    Education/ Strategy Training Provided attention packet and attention fatigue scale.  Pt requesting processing exercises.  CT login: [email or web address]  PW: [email or web address]    Assigned Organized Mind to listen to- answer questions upon return. Did not complete   Other Reviewed sleep routines (11-7) and target goals, meal routines, water recommendations.     F/u Dr. Mcdermott?  November 1, part time- consider daily, decreased hours.     APT Tracking    Sustained attention  Date 9/16/19 10/1/2019 10/8/2019   Tasks              Ascending # (aud) 72% 3 effort  fast 100% 6 effort  slow 71% 7 effort  fast   Matching clocks (vis) 73% 5 effort  fast 100% 7 effort  slow 94% 8 effort fast           Search for processing games.

## 2019-10-16 NOTE — PROGRESS NOTES
SLP DAILY NOTE FOR OUTPATIENT THERAPY    Patient: Sarah Beth Person   MRN: [id number]  : 1970 49 y.o.  Referring Physician: Maynor Mcdermott MD  Date of Visit: 10/15/2019      Certification Dates: 09/10/19 through 19    Diagnosis:   1. Mild cognitive impairment        Chief Complaints:   Chief Complaint   Patient presents with   • Cognition       Precautions:      TODAY'S VISIT:    General Information - 10/15/19 1209        Session Details    Document Type  daily treatment     Mode of Treatment  individual therapy;speech language pathology        Time Calculation    Start Time  1200     Stop Time  1300     Time Calculation (min)  60 min         Pain and Vitals - 10/15/19 1209        Pain/Comfort/Sleep    Currently in Pain  No/denies        Pain Interventions    Intervention  none     Post Intervention Comments  none         Daily Falls Screen - 10/15/19 1216        Daily Falls Assessment    Patient reported fall since last visit  No         Daily Treatment Assessment and Plan - 10/15/19 2054        Daily Treatment Assessment and Plan    Progress toward goals  Progressing     Daily Outcome Summary  Pt became overwhelmed with internal distractions of what appeared to be self doubt.  Pt required visuals, manipulatives and extra time to complete WM task.      Plan and Recommendations  Auditory attention for sentences, paragraph, increase complexity, manage internal distractions, task initiation.            OBJECTIVE MEASUREMENTS TAKEN TODAY:    None Taken    Today's Treatment:      COGNITION SLP FLOW SHEET    SKILL AREA Progress CURRENT SESSION   SPEECH THERAPY: COGNITION     Attention  Moderate to complex sustained attention:  Rate 1-2 on:  Concentrating for long periods of time  Mind wanders more easily  Getting mentally tired more easily    Complete in distraction   Oriented to 5 types of attention, provided packet.  Introduced modification strategies for Difficulty, familiarity, enjoyment    Auditory  attention- functional memory book, p 252  First trial- introduction 5/8  Second trial- 6/8  3rd trial- 8/8    Increased internal distractions, tearful.   Auditory attention:  4 word sentences:  Fwd: 10/10 (I), Bwd 10/10 (I)    6 word sentences:  Fwd: 10/10 (I); Bwd 8/10 min A (use of visuals, chunking)    Visual attention:  4 blocks: 3/4 (I), 4/4 mod A  Extra time, second guessing self.     Orientation     Memory  Memory planner-12 phases- adapted to pt    Immediate memory:   Rep of complex sentences-90% of trials  Paragraph retell 80% of details    WM:Moderate  Mental calc  Mental manip    30+ min of delayed recall  Mental Manipulation 7/11, fatigue due to internal distraction  +++-+++  +---, pt became tearful due to frustration.  Task Dc'd.     Repeat sentences:   Simple:  ++  Moderate:  ++-++  Complex:   +----  auditory memory:  Repeat number sequences L3/5: 84% accuracy, 32%tile latency    Visual memory:  Pattern Recreation L6/12: 71%tile accuracy, 17%tile latency    See auditory and visual attention for memory above.    Processing Flanker L1/1: 100% accuracy, 68%tile latency  Playing Slapjack L3/3: 100% accuracy, 26 %tile latency  Symbol matching L10/10: 100% accuracy, 64 %tile latency  Picture N-Back Memory L2/3: 73% accuracy, 35 %tile latency   Decreased auditory attention results in decreased ability to process memory connections. Observed decreased processing on both visual and auditory tasks.    Problem Solving/Reasoning (Money/Math, Safety , Multistep )     Time Management Completed Time management orientation with phases 9-12.  Continue to monitor and manage with fading cues.      Planning/Organization (Executive Functioning) Visual processing bood: p 189. Ex. 36 Maps  Anticipated task to be a 2- simple to moderate.  Completed 1 map task in 5 minutes, found error and demonstrated difficulty self correcting error.   Therapist requested pt write 3 distractions on 3 pieces of paper.  If distractions  appeared during session and could not be suppressed, pt was advised to take 20-30 second break to write down distraction and return to task.  Pt was not distracted with 3 items during the session.  Advised to practice this strategy at home.    Symptom  Management  Attention Fatigue Scale: 3 or less    PSFS: goal: 80% 4, 5, DC'd task before therapist could recommend a break.   Pt states often finds self to be in 4-6, at times higher.  4-6, increased due to internal distractions. Unable to demonstrate auditory sustained attention with working memory task due to what appeared self doubt.    Education/ Strategy Training Provided attention packet and attention fatigue scale.  Pt requesting processing exercises.  CT login: [email or web address]  PW: [email or web address]    Assigned Organized Mind to listen to- answer questions upon return. Did not complete   Other Reviewed sleep routines (11-7) and target goals, meal routines, water recommendations.     F/u Dr. Mcdermott?  November 1, part time- consider daily, decreased hours.     APT Tracking    Sustained attention  Date 9/16/19 10/1/2019 10/8/2019   Tasks              Ascending # (aud) 72% 3 effort  fast 100% 6 effort  slow 71% 7 effort  fast   Matching clocks (vis) 73% 5 effort  fast 100% 7 effort  slow 94% 8 effort fast           Search for processing games.

## 2019-10-17 ENCOUNTER — HOSPITAL ENCOUNTER (OUTPATIENT)
Dept: SPEECH THERAPY | Facility: REHABILITATION | Age: 49
Setting detail: THERAPIES SERIES
Discharge: HOME | End: 2019-10-17
Attending: PSYCHIATRY & NEUROLOGY
Payer: COMMERCIAL

## 2019-10-17 DIAGNOSIS — G31.84 MILD COGNITIVE IMPAIRMENT: Primary | ICD-10-CM

## 2019-10-17 PROCEDURE — 92507 TX SP LANG VOICE COMM INDIV: CPT

## 2019-10-17 NOTE — PROGRESS NOTES
"SLP DAILY NOTE FOR OUTPATIENT THERAPY    Patient: Sarah Beth Person   MRN: [id number]  : 1970 49 y.o.  Referring Physician: Maynor Mcdermott MD  Date of Visit: 10/17/2019      Certification Dates: 09/10/19 through 19    Diagnosis:   1. Mild cognitive impairment        Chief Complaints:   Chief Complaint   Patient presents with   • Cognition     concentration- \"too much noise\"       Precautions:      TODAY'S VISIT:    General Information - 10/17/19 1004        Session Details    Document Type  daily treatment     Mode of Treatment  individual therapy;speech language pathology        Time Calculation    Start Time  1000     Stop Time  1100     Time Calculation (min)  60 min         Pain and Vitals - 10/17/19 1003        Pain Assessment    Currently in pain  No/Denies        Pain Interventions    Intervention  none     Post Intervention Comments  none         Daily Falls Screen - 10/17/19 1006        Daily Falls Assessment    Patient reported fall since last visit  No         Daily Treatment Assessment and Plan - 10/17/19 1012        Daily Treatment Assessment and Plan    Progress toward goals  Progressing     Daily Outcome Summary  Pt noted decreased attention and memory of complex sentences with greater length.      Plan and Recommendations  Auditory attention for sentences (fwd/bwd), paragraph, increase complexity, manage internal distractions, task initiation. Visual attention to recreate block structures.             OBJECTIVE MEASUREMENTS TAKEN TODAY:    None Taken    Today's Treatment:      COGNITION SLP FLOW SHEET    SKILL AREA Progress CURRENT SESSION   SPEECH THERAPY: COGNITION     Attention  Moderate to complex sustained attention:  Rate 1-2 on:  Concentrating for long periods of time  Mind wanders more easily  Getting mentally tired more easily    Complete in distraction   Oriented to 5 types of attention, provided packet.  Introduced modification strategies for Difficulty, familiarity, " enjoyment    Auditory attention- functional memory book, p 252  First trial- introduction 5/8  Second trial- 6/8  3rd trial- 8/8    Increased internal distractions, tearful.    Auditory attention:  4 word sentences:  Fwd: 10/10 (I), Bwd 10/10 (I)    6 word sentences:  Fwd: 10/10 (I); Bwd 8/10 min A (use of visuals, chunking)    Visual attention:  4 blocks: 3/4 (I), 4/4 mod A  Extra time, second guessing self.       Concentration for a long period of time: 3  Finding attention wanders more easily: 1  Getting mentally tired more easily: 1   Orientation     Memory  Memory planner-12 phases- adapted to pt    Immediate memory:   Rep of complex sentences-90% of trials  Paragraph retell 80% of details    WM:Moderate  Mental calc  Mental manip    30+ min of delayed recall  Mental Manipulation 7/11, fatigue due to internal distraction  +++-+++  +---, pt became tearful due to frustration.  Task Dc'd.     Repeat sentences:   Simple:  ++  Moderate:  ++-++  Complex:   +----  auditory memory:  Repeat number sequences L3/5: 84% accuracy, 32%tile latency    Visual memory:  Pattern Recreation L6/12: 71%tile accuracy, 17%tile latency    Simple sentence repetition: (I) 10/10  Simple sentence fill-in:  8/10, min A to Supervision    Complex sentence repetition: 5/5 Mod I  Complex sentence fill-in: 13/19,        Processing   Picture N-Back Memory L2/3: 73% accuracy, 35 %tile latency CT:   Slapjack L3/3: 100%, 52%tile latency (improved to WNL)  Alternating symbol Matching L6/8: 100%, 60%tile latency  Picture N-Back Memory L2/3: 97%, 17%tile latency       Problem Solving/Reasoning (Money/Math, Safety , Multistep )     Time Management Completed Time management orientation with phases 9-12.  Continue to monitor and manage with fading cues.      Planning/Organization (Executive Functioning) Visual processing bood: p 189. Ex. 36 Maps  Anticipated task to be a 2- simple to moderate.  Completed 1 map task in 5 minutes, found error and  demonstrated difficulty self correcting error.   Therapist requested pt write 3 distractions on 3 pieces of paper.  If distractions appeared during session and could not be suppressed, pt was advised to take 20-30 second break to write down distraction and return to task.  Pt was not distracted with 3 items during the session.  Advised to practice this strategy at home.    Symptom  Management  Attention Fatigue Scale: 3 or less    PSFS: goal: 80% 4, 5, DC'd task before therapist could recommend a break.   Pt states often finds self to be in 4-6, at times higher.  3,     Education/ Strategy Training Provided attention packet and attention fatigue scale.  Pt requesting processing exercises.  CT login: [email or web address]  PW: [email or web address]    Assigned Organized Mind to listen to- answer questions upon return. Did not complete   Other Reviewed sleep routines (11-7) and target goals, meal routines, water recommendations.     F/u Dr. Mcdermott?  November 1, part time- consider daily, decreased hours.     APT Tracking    Sustained attention  Date 9/16/19 10/1/2019 10/8/2019   Tasks              Ascending # (aud) 72% 3 effort  fast 100% 6 effort  slow 71% 7 effort  fast   Matching clocks (vis) 73% 5 effort  fast 100% 7 effort  slow 94% 8 effort fast

## 2019-10-17 NOTE — OP SLP TREATMENT LOG
COGNITION SLP FLOW SHEET    SKILL AREA Progress CURRENT SESSION   SPEECH THERAPY: COGNITION     Attention  Moderate to complex sustained attention:  Rate 1-2 on:  Concentrating for long periods of time  Mind wanders more easily  Getting mentally tired more easily    Complete in distraction   Oriented to 5 types of attention, provided packet.  Introduced modification strategies for Difficulty, familiarity, enjoyment    Auditory attention- functional memory book, p 252  First trial- introduction 5/8  Second trial- 6/8  3rd trial- 8/8    Increased internal distractions, tearful.    Auditory attention:  4 word sentences:  Fwd: 10/10 (I), Bwd 10/10 (I)    6 word sentences:  Fwd: 10/10 (I); Bwd 8/10 min A (use of visuals, chunking)    Visual attention:  4 blocks: 3/4 (I), 4/4 mod A  Extra time, second guessing self.       Concentration for a long period of time: 3  Finding attention wanders more easily: 1  Getting mentally tired more easily: 1   Orientation     Memory  Memory planner-12 phases- adapted to pt    Immediate memory:   Rep of complex sentences-90% of trials  Paragraph retell 80% of details    WM:Moderate  Mental calc  Mental manip    30+ min of delayed recall  Mental Manipulation 7/11, fatigue due to internal distraction  +++-+++  +---, pt became tearful due to frustration.  Task Dc'd.     Repeat sentences:   Simple:  ++  Moderate:  ++-++  Complex:   +----  auditory memory:  Repeat number sequences L3/5: 84% accuracy, 32%tile latency    Visual memory:  Pattern Recreation L6/12: 71%tile accuracy, 17%tile latency    Simple sentence repetition: (I) 10/10  Simple sentence fill-in:  8/10, min A to Supervision    Complex sentence repetition: 5/5 Mod I  Complex sentence fill-in: 13/19,        Processing   Picture N-Back Memory L2/3: 73% accuracy, 35 %tile latency CT:   Slapjack L3/3: 100%, 52%tile latency (improved to WNL)  Alternating symbol Matching L6/8: 100%, 60%tile latency  Picture N-Back Memory L2/3: 97%,  17%tile latency       Problem Solving/Reasoning (Money/Math, Safety , Multistep )     Time Management Completed Time management orientation with phases 9-12.  Continue to monitor and manage with fading cues.      Planning/Organization (Executive Functioning) Visual processing bood: p 189. Ex. 36 Maps  Anticipated task to be a 2- simple to moderate.  Completed 1 map task in 5 minutes, found error and demonstrated difficulty self correcting error.   Therapist requested pt write 3 distractions on 3 pieces of paper.  If distractions appeared during session and could not be suppressed, pt was advised to take 20-30 second break to write down distraction and return to task.  Pt was not distracted with 3 items during the session.  Advised to practice this strategy at home.    Symptom  Management  Attention Fatigue Scale: 3 or less    PSFS: goal: 80% 4, 5, DC'd task before therapist could recommend a break.   Pt states often finds self to be in 4-6, at times higher.  3,     Education/ Strategy Training Provided attention packet and attention fatigue scale.  Pt requesting processing exercises.  CT login: [email or web address]  PW: [email or web address]    Assigned Organized Mind to listen to- answer questions upon return. Did not complete   Other Reviewed sleep routines (11-7) and target goals, meal routines, water recommendations.     F/u Dr. Mcdermott?  November 1, part time- consider daily, decreased hours.     APT Tracking    Sustained attention  Date 9/16/19 10/1/2019 10/8/2019   Tasks              Ascending # (aud) 72% 3 effort  fast 100% 6 effort  slow 71% 7 effort  fast   Matching clocks (vis) 73% 5 effort  fast 100% 7 effort  slow 94% 8 effort fast

## 2019-10-22 ENCOUNTER — HOSPITAL ENCOUNTER (OUTPATIENT)
Dept: SPEECH THERAPY | Facility: REHABILITATION | Age: 49
Setting detail: THERAPIES SERIES
Discharge: HOME | End: 2019-10-22
Attending: PSYCHIATRY & NEUROLOGY
Payer: COMMERCIAL

## 2019-10-22 DIAGNOSIS — G31.84 MILD COGNITIVE IMPAIRMENT: Primary | ICD-10-CM

## 2019-10-22 PROCEDURE — 92507 TX SP LANG VOICE COMM INDIV: CPT

## 2019-10-22 NOTE — OP SLP TREATMENT LOG
COGNITION SLP FLOW SHEET    SKILL AREA Progress CURRENT SESSION   SPEECH THERAPY: COGNITION     Attention  Moderate to complex sustained attention:  Rate 1-2 on:  Concentrating for long periods of time  Mind wanders more easily  Getting mentally tired more easily    Complete in distraction   Oriented to 5 types of attention, provided packet.  Introduced modification strategies for Difficulty, familiarity, enjoyment    Auditory attention- functional memory book, p 252  First trial- introduction 5/8  Second trial- 6/8  3rd trial- 8/8    Increased internal distractions, tearful.    Auditory attention:  4 word sentences:  Fwd: 10/10 (I), Bwd 10/10 (I)    6 word sentences:  Fwd: 10/10 (I); Bwd 8/10 min A (use of visuals, chunking)    Visual attention:  4 blocks: 3/4 (I), 4/4 mod A  Extra time, second guessing self.     Practiced gaining re-focus during complex sustained and alternating attention task    Orientation     Memory  Memory planner-12 phases- adapted to pt    Immediate memory:   Rep of complex sentences-90% of trials  Paragraph retell 80% of details    WM:Moderate  Mental calc  Mental manip    30+ min of delayed recall  Mental Manipulation 7/11, fatigue due to internal distraction  +++-+++  +---, pt became tearful due to frustration.  Task Dc'd.     Repeat number sequences L3/5: 84% accuracy, 32%tile latency    Visual memory:  Pattern Recreation L6/12: 71%tile accuracy, 17%tile latency  Simple sentence repetition: (I) 10/10  Simple sentence fill-in:  8/10, min A to Supervision    Complex sentence repetition: 5/5 Mod I  Complex sentence fill-in: 13/19,     Memory: Visual 5 shapes structure.   Mod A to use WRAP-CA  Supervision for recall, 5/5.             Processing Slapjack L3/3: 100%, 52%tile latency (improved to WNL)  Alternating symbol Matching L6/8: 100%, 60%tile latency  Picture N-Back Memory L2/3: 97%, 17%tile latency  y See APT scores         Problem Solving/Reasoning (Money/Math, Safety , Multistep )      Time Management Completed Time management orientation with phases 9-12.  Continue to monitor and manage with fading cues.      Planning/Organization (Executive Functioning) Visual processing bood: p 189. Ex. 36 Maps  Anticipated task to be a 2- simple to moderate.  Completed 1 map task in 5 minutes, found error and demonstrated difficulty self correcting error.   Practiced stating goal, refocusing to goal, sustaining attention to goal.     Symptom  Management  Attention Fatigue Scale: 3 or less    PSFS: goal: 80% 4, 5, DC'd task before therapist could recommend a break.   Pt states often finds self to be in 4-6, at times higher.  3,     Education/ Strategy Training Provided attention packet and attention fatigue scale.  Pt requesting processing exercises.  CT login: [email or web address]  PW: [email or web address]    Assigned Organized Mind to listen to- answer questions upon return. Did not complete   Other Reviewed sleep routines (11-7) and target goals, meal routines, water recommendations.     Dr. 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  November 1, part time- consider daily, decreased hours.     APT Tracking    Sustained attention  Date 9/16/19 10/1/2019 10/8/2019   Tasks              Ascending # (aud) 72% 3 effort  fast 100% 6 effort  slow 71% 7 effort  fast   Matching clocks (vis) 73% 5 effort  fast 100% 7 effort  slow 94% 8 effort fast       Alternating attention  Date 10/22/2019     Tasks              Happy/Sad (auditory) 23% 9 effort  fast     Hi-mid-low (Visual) 56% 9 effort  fast

## 2019-10-22 NOTE — PROGRESS NOTES
"SLP DAILY NOTE FOR OUTPATIENT THERAPY    Patient: Sarah Beth Person   MRN: [id number]  : 1970 49 y.o.  Referring Physician: Maynor Mcdermott MD  Date of Visit: 10/22/2019      Certification Dates: 09/10/19 through 19    Diagnosis:   1. Mild cognitive impairment        Chief Complaints:   Chief Complaint   Patient presents with   • Cognition     emotionally distracted.         Precautions:      TODAY'S VISIT:    General Information - 10/22/19 1211        Session Details    Document Type  daily treatment     Mode of Treatment  individual therapy;speech language pathology     Patient/Family/Caregiver Comments/Observations  Discussed with counselor [place] sitting at home alone and dwelling.  Acitiviteis include fitness, walking, games/practices, but sitting home without a filled schedule        Time Calculation    Start Time  1200     Stop Time  1300     Time Calculation (min)  60 min         Pain and Vitals - 10/22/19 1211        Pain Assessment    Currently in pain  No/Denies        Pain Interventions    Intervention  none     Post Intervention Comments  none         Daily Falls Screen - 10/22/19 1214        Daily Falls Assessment    Patient reported fall since last visit  No         Daily Treatment Assessment and Plan - 10/22/19 1243        Daily Treatment Assessment and Plan    Progress toward goals  Progressing     Daily Outcome Summary  Pt identified need for practice to 're-focus.\" remind self of goals to increase focus and sustain attention to directed task.       Plan and Recommendations  Auditory attention for sentences (fwd/bwd), paragraph, increase complexity, manage internal distractions, task initiation. Visual attention to recreate block structures.             OBJECTIVE MEASUREMENTS TAKEN TODAY:    None Taken    Today's Treatment:      COGNITION SLP FLOW SHEET    SKILL AREA Progress CURRENT SESSION   SPEECH THERAPY: COGNITION     Attention  Moderate to complex sustained " attention:  Rate 1-2 on:  Concentrating for long periods of time  Mind wanders more easily  Getting mentally tired more easily    Complete in distraction   Oriented to 5 types of attention, provided packet.  Introduced modification strategies for Difficulty, familiarity, enjoyment    Auditory attention- functional memory book, p 252  First trial- introduction 5/8  Second trial- 6/8  3rd trial- 8/8    Increased internal distractions, tearful.    Auditory attention:  4 word sentences:  Fwd: 10/10 (I), Bwd 10/10 (I)    6 word sentences:  Fwd: 10/10 (I); Bwd 8/10 min A (use of visuals, chunking)    Visual attention:  4 blocks: 3/4 (I), 4/4 mod A  Extra time, second guessing self.     Practiced gaining re-focus during complex sustained and alternating attention task    Orientation     Memory  Memory planner-12 phases- adapted to pt    Immediate memory:   Rep of complex sentences-90% of trials  Paragraph retell 80% of details    WM:Moderate  Mental calc  Mental manip    30+ min of delayed recall  Mental Manipulation 7/11, fatigue due to internal distraction  +++-+++  +---, pt became tearful due to frustration.  Task Dc'd.     Repeat number sequences L3/5: 84% accuracy, 32%tile latency    Visual memory:  Pattern Recreation L6/12: 71%tile accuracy, 17%tile latency  Simple sentence repetition: (I) 10/10  Simple sentence fill-in:  8/10, min A to Supervision    Complex sentence repetition: 5/5 Mod I  Complex sentence fill-in: 13/19,     Memory: Visual 5 shapes structure.   Mod A to use WRAP-CA  Supervision for recall, 5/5.             Processing Slapjack L3/3: 100%, 52%tile latency (improved to WNL)  Alternating symbol Matching L6/8: 100%, 60%tile latency  Picture N-Back Memory L2/3: 97%, 17%tile latency  y See APT scores         Problem Solving/Reasoning (Money/Math, Safety , Multistep )     Time Management Completed Time management orientation with phases 9-12.  Continue to monitor and manage with fading cues.       Planning/Organization (Executive Functioning) Visual processing bood: p 189. Ex. 36 Maps  Anticipated task to be a 2- simple to moderate.  Completed 1 map task in 5 minutes, found error and demonstrated difficulty self correcting error.   Practiced stating goal, refocusing to goal, sustaining attention to goal.     Symptom  Management  Attention Fatigue Scale: 3 or less    PSFS: goal: 80% 4, 5, DC'd task before therapist could recommend a break.   Pt states often finds self to be in 4-6, at times higher.  3,     Education/ Strategy Training Provided attention packet and attention fatigue scale.  Pt requesting processing exercises.  CT login: [email or web address]  PW: [email or web address]    Assigned Organized Mind to listen to- answer questions upon return. Did not complete   Other Reviewed sleep routines (11-7) and target goals, meal routines, water recommendations.     Dr. 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  November 1, part time- consider daily, decreased hours.     APT Tracking    Sustained attention  Date 9/16/19 10/1/2019 10/8/2019   Tasks              Ascending # (aud) 72% 3 effort  fast 100% 6 effort  slow 71% 7 effort  fast   Matching clocks (vis) 73% 5 effort  fast 100% 7 effort  slow 94% 8 effort fast       Alternating attention  Date 10/22/2019     Tasks              Happy/Sad (auditory) 23% 9 effort  fast     Hi-mid-low (Visual) 56% 9 effort  fast

## 2019-10-24 ENCOUNTER — HOSPITAL ENCOUNTER (OUTPATIENT)
Dept: SPEECH THERAPY | Facility: REHABILITATION | Age: 49
Setting detail: THERAPIES SERIES
Discharge: HOME | End: 2019-10-24
Attending: PSYCHIATRY & NEUROLOGY
Payer: COMMERCIAL

## 2019-10-24 DIAGNOSIS — G31.84 MILD COGNITIVE IMPAIRMENT: Primary | ICD-10-CM

## 2019-10-24 PROCEDURE — 92507 TX SP LANG VOICE COMM INDIV: CPT

## 2019-10-24 NOTE — OP SLP TREATMENT LOG
COGNITION SLP FLOW SHEET    SKILL AREA Progress CURRENT SESSION   SPEECH THERAPY: COGNITION     Attention  Moderate to complex sustained attention:  Rate 1-2 on:  Concentrating for long periods of time  Mind wanders more easily  Getting mentally tired more easily    Complete in distraction   Oriented to 5 types of attention, provided packet.  Introduced modification strategies for Difficulty, familiarity, enjoyment    Auditory attention- functional memory book, p 252  First trial- introduction 5/8  Second trial- 6/8  3rd trial- 8/8    Increased internal distractions, tearful.    Auditory attention:  4 word sentences:  Fwd: 10/10 (I), Bwd 10/10 (I)    6 word sentences:  Fwd: 10/10 (I); Bwd 8/10 min A (use of visuals, chunking)    Visual attention:  4 blocks: 3/4 (I), 4/4 mod A  Extra time, second guessing self.   Identifying mind wandering during visual sustained attention task.  Mind wanders in self doubt.  Pt feels she is engaged in sustained attention because self doubt is about the topic at hand, but distracts from memory recall.      Visual sustained attention to practice WRAP-CA strategies for block designs.  Increased difficulty with sustained attention to organize and recall.     Orientation     Memory  Memory planner-12 phases- adapted to pt    Immediate memory:   Rep of complex sentences-90% of trials  Paragraph retell 80% of details    WM:Moderate  Mental calc  Mental manip    30+ min of delayed recall  Mental Manipulation 7/11, fatigue due to internal distraction  +++-+++  +---, pt became tearful due to frustration.  Task Dc'd.     Repeat number sequences L3/5: 84% accuracy, 32%tile latency    Visual memory:  Pattern Recreation L6/12: 71%tile accuracy, 17%tile latency  Simple sentence repetition: (I) 10/10  Simple sentence fill-in:  8/10, min A to Supervision    Complex sentence repetition: 5/5 Mod I  Complex sentence fill-in: 13/19,     Memory: Visual 5 shapes structure.   Min A to use  WRAP-CA  Supervision-min A for recall, 5/5.    Sustained attention with retrieval competed with self doubt, Difficulty initiating, difficulty with trial and deductive reasoning.  Decreased processing speed- taking a long time.             Processing Slapjack L3/3: 100%, 52%tile latency (improved to WNL)  Alternating symbol Matching L6/8: 100%, 60%tile latency  Picture N-Back Memory L2/3: 97%, 17%tile latency   See APT scores         Problem Solving/Reasoning (Money/Math, Safety , Multistep )     Time Management Completed Time management orientation with phases 9-12.  Continue to monitor and manage with fading cues.      Planning/Organization (Executive Functioning) Visual processing bood: p 189. Ex. 36 Maps  Anticipated task to be a 2- simple to moderate.  Completed 1 map task in 5 minutes, found error and demonstrated difficulty self correcting error.   Practiced stating goal, refocusing to goal, sustaining attention to goal.     Symptom  Management  Attention Fatigue Scale: 3 or less    PSFS: goal: 80% 4, 5, DC'd task before therapist could recommend a break.   Pt states often finds self to be in 4-6, at times higher.  3- encouraged removal.    Education/ Strategy Training Provided attention packet and attention fatigue scale.  Pt requesting processing exercises.  CT login: [email or web address]  PW: [email or web address]  Discussed Quirkle, SET  Assigned Organized Mind to listen to- answer questions upon return. Did not complete   Other Reviewed sleep routines (11-7) and target goals, meal routines, water recommendations.     Dr. 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  November 1, part time- consider daily, decreased hours.     APT Tracking    Sustained attention  Date 9/16/19 10/1/2019 10/8/2019   Tasks              Ascending # (aud) 72% 3 effort  fast 100% 6 effort  slow 71% 7 effort  fast   Matching clocks (vis) 73% 5 effort  fast 100% 7 effort  slow 94% 8 effort fast       Alternating attention  Date 10/22/2019     Tasks              Happy/Sad  (auditory) 23% 9 effort  fast     Hi-mid-low (Visual) 56% 9 effort  fast

## 2019-10-24 NOTE — PROGRESS NOTES
SLP DAILY NOTE FOR OUTPATIENT THERAPY    Patient: Sarah Beth Person   MRN: [id number]  : 1970 49 y.o.  Referring Physician: Maynor Mcdermott MD  Date of Visit: 10/24/2019      Certification Dates: 09/10/19 through 19    Diagnosis:   1. Mild cognitive impairment        Chief Complaints:   Chief Complaint   Patient presents with   • Cognition     auditory attention       Precautions:      TODAY'S VISIT:    General Information - 10/24/19 1109        Session Details    Document Type  daily treatment     Mode of Treatment  individual therapy;speech language pathology     Patient/Family/Caregiver Comments/Observations  Scratchy throat, nasal congestion.  Not sleeping well.  Discussed insurance plans that provide , counseling.          Time Calculation    Start Time  1100     Stop Time  1200     Time Calculation (min)  60 min         Pain and Vitals - 10/24/19 1109        Pain Assessment    Currently in pain  No/Denies        Pain Interventions    Intervention  none- allergies today     Post Intervention Comments  none         Daily Falls Screen - 10/24/19 1119        Daily Falls Assessment    Patient reported fall since last visit  No         Daily Treatment Assessment and Plan - 10/24/19 1226        Daily Treatment Assessment and Plan    Progress toward goals  Progressing     Daily Outcome Summary  Continued delayed processing, decreased poor initiation, working memory- results in inability to hold and manipulate multiple groups characteristics.       Plan and Recommendations  Introduce games for processing speed, initiation and decision making, w-holding sets of attributes in head.  (YOHANA ROSE)           OBJECTIVE MEASUREMENTS TAKEN TODAY:    None Taken    Today's Treatment:      COGNITION SLP FLOW SHEET    SKILL AREA Progress CURRENT SESSION   SPEECH THERAPY: COGNITION     Attention  Moderate to complex sustained attention:  Rate 1-2 on:  Concentrating for long periods of time  Mind wanders  more easily  Getting mentally tired more easily    Complete in distraction   Oriented to 5 types of attention, provided packet.  Introduced modification strategies for Difficulty, familiarity, enjoyment    Auditory attention- functional memory book, p 252  First trial- introduction 5/8  Second trial- 6/8  3rd trial- 8/8    Increased internal distractions, tearful.    Auditory attention:  4 word sentences:  Fwd: 10/10 (I), Bwd 10/10 (I)    6 word sentences:  Fwd: 10/10 (I); Bwd 8/10 min A (use of visuals, chunking)    Visual attention:  4 blocks: 3/4 (I), 4/4 mod A  Extra time, second guessing self.   Identifying mind wandering during visual sustained attention task.  Mind wanders in self doubt.  Pt feels she is engaged in sustained attention because self doubt is about the topic at hand, but distracts from memory recall.      Visual sustained attention to practice WRAP-CA strategies for block designs.  Increased difficulty with sustained attention to organize and recall.     Orientation     Memory  Memory planner-12 phases- adapted to pt    Immediate memory:   Rep of complex sentences-90% of trials  Paragraph retell 80% of details    WM:Moderate  Mental calc  Mental manip    30+ min of delayed recall  Mental Manipulation 7/11, fatigue due to internal distraction  +++-+++  +---, pt became tearful due to frustration.  Task Dc'd.     Repeat number sequences L3/5: 84% accuracy, 32%tile latency    Visual memory:  Pattern Recreation L6/12: 71%tile accuracy, 17%tile latency  Simple sentence repetition: (I) 10/10  Simple sentence fill-in:  8/10, min A to Supervision    Complex sentence repetition: 5/5 Mod I  Complex sentence fill-in: 13/19,     Memory: Visual 5 shapes structure.   Min A to use WRAP-CA  Supervision-min A for recall, 5/5.    Sustained attention with retrieval competed with self doubt, Difficulty initiating, difficulty with trial and deductive reasoning.  Decreased processing speed- taking a long time.              Processing Slapjack L3/3: 100%, 52%tile latency (improved to WNL)  Alternating symbol Matching L6/8: 100%, 60%tile latency  Picture N-Back Memory L2/3: 97%, 17%tile latency   See APT scores         Problem Solving/Reasoning (Money/Math, Safety , Multistep )     Time Management Completed Time management orientation with phases 9-12.  Continue to monitor and manage with fading cues.      Planning/Organization (Executive Functioning) Visual processing bood: p 189. Ex. 36 Maps  Anticipated task to be a 2- simple to moderate.  Completed 1 map task in 5 minutes, found error and demonstrated difficulty self correcting error.   Practiced stating goal, refocusing to goal, sustaining attention to goal.     Symptom  Management  Attention Fatigue Scale: 3 or less    PSFS: goal: 80% 4, 5, DC'd task before therapist could recommend a break.   Pt states often finds self to be in 4-6, at times higher.  3- encouraged removal.    Education/ Strategy Training Provided attention packet and attention fatigue scale.  Pt requesting processing exercises.  CT login: [email or web address]  PW: [email or web address]  Discussed Quirkle, SET  Assigned Organized Mind to listen to- answer questions upon return. Did not complete   Other Reviewed sleep routines (11-7) and target goals, meal routines, water recommendations.     Dr. 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  November 1, part time- consider daily, decreased hours.     APT Tracking    Sustained attention  Date 9/16/19 10/1/2019 10/8/2019   Tasks              Ascending # (aud) 72% 3 effort  fast 100% 6 effort  slow 71% 7 effort  fast   Matching clocks (vis) 73% 5 effort  fast 100% 7 effort  slow 94% 8 effort fast       Alternating attention  Date 10/22/2019     Tasks              Happy/Sad (auditory) 23% 9 effort  fast     Hi-mid-low (Visual) 56% 9 effort  fast

## 2019-10-29 ENCOUNTER — HOSPITAL ENCOUNTER (OUTPATIENT)
Dept: SPEECH THERAPY | Facility: REHABILITATION | Age: 49
Setting detail: THERAPIES SERIES
Discharge: HOME | End: 2019-10-29
Attending: PSYCHIATRY & NEUROLOGY
Payer: COMMERCIAL

## 2019-10-29 DIAGNOSIS — G31.84 MILD COGNITIVE IMPAIRMENT: Primary | ICD-10-CM

## 2019-10-29 PROCEDURE — 92507 TX SP LANG VOICE COMM INDIV: CPT

## 2019-10-29 NOTE — PROGRESS NOTES
SLP DAILY NOTE FOR OUTPATIENT THERAPY    Patient: Sarah Beth Person   MRN: [id number]  : 1970 49 y.o.  Referring Physician: Maynor Mcdermott MD  Date of Visit: 10/29/2019      Certification Dates: 09/10/19 through 19    Diagnosis:   1. Mild cognitive impairment        Chief Complaints:   Chief Complaint   Patient presents with   • Cognition     Continues to find writing things down helps with memory       Precautions:      TODAY'S VISIT:    General Information - 10/29/19 1207        Session Details    Document Type  daily treatment     Mode of Treatment  individual therapy;speech language pathology     Patient/Family/Caregiver Comments/Observations  No improvement with sleeping.  Taking sleeping pill.  Waking and not returning to sleep.         Time Calculation    Start Time  1200     Stop Time  1300     Time Calculation (min)  60 min         Pain and Vitals - 10/29/19 1207        Pain Assessment    Currently in pain  No/Denies     Preferred Pain Scale  number (Numeric Rating Pain Scale)        Pain Interventions    Intervention  none     Post Intervention Comments  none         Daily Falls Screen - 10/29/19 1210        Daily Falls Assessment    Patient reported fall since last visit  No         Daily Treatment Assessment and Plan - 10/29/19 1232        Daily Treatment Assessment and Plan    Progress toward goals  Slower than expected     Daily Outcome Summary  Pt continues to self intenally distract making sustained attention tasks in selective or even alternating attention tasks, reducing accuracy.  Therapist defers to recommendations of counseling for self distractions and self esteem      Plan and Recommendations  Modify games for processing speed, initiation and decision making, w-holding sets of attributes in head.  (YOHANA ROSE); CT log on.            OBJECTIVE MEASUREMENTS TAKEN TODAY:    None Taken    Today's Treatment:      COGNITION SLP FLOW SHEET    SKILL AREA Progress CURRENT SESSION    SPEECH THERAPY: COGNITION     Attention  Moderate to complex sustained attention:  Rate 1-2 on:  Concentrating for long periods of time  Mind wanders more easily  Getting mentally tired more easily    Complete in distraction   Oriented to 5 types of attention, provided packet.  Introduced modification strategies for Difficulty, familiarity, enjoyment    Auditory attention- functional memory book, p 252  First trial- introduction 5/8  Second trial- 6/8  3rd trial- 8/8    Increased internal distractions, tearful.    Auditory attention:  4 word sentences:  Fwd: 10/10 (I), Bwd 10/10 (I)    6 word sentences:  Fwd: 10/10 (I); Bwd 8/10 min A (use of visuals, chunking)    Visual attention:  4 blocks: 3/4 (I), 4/4 mod A  Extra time, second guessing self.   Constant Therapy:  Picture N-Back Memory L2/3: 100% acc, 30%tile latency  Repeat number sequences L4/5: 80% acc, 25%tile latency  Pattern Recreation L6/12: 90% acc, 32%tile latency  Auditory Commands L4/10: 92% acc, 57%tile latency    Increased difficulty with task of longer periods of time.  (10 items, accuracy would decrease, particularly on auditory tasks.    Sustained attention to self-learning opportunity of unfamiliar task:  SET game: Reading instructions to learn   Improved ability to learn with joint attention from therapist to ask leading questions.      Continues to required Mod A to identify SET.    Orientation     Memory  Memory planner-12 phases- adapted to pt    Immediate memory:   Rep of complex sentences-90% of trials  Paragraph retell 80% of details    WM:Moderate  Mental calc  Mental manip    30+ min of delayed recall  Mental Manipulation 7/11, fatigue due to internal distraction  +++-+++  +---, pt became tearful due to frustration.  Task Dc'd.     Repeat number sequences L3/5: 84% accuracy, 32%tile latency    Visual memory:  Pattern Recreation L6/12: 71%tile accuracy, 17%tile latency  Simple sentence repetition: (I) 10/10  Simple sentence fill-in:   8/10, min A to Supervision    Complex sentence repetition: 5/5 Mod I  Complex sentence fill-in: 13/19,     Memory: Visual 5 shapes structure.   Min A to use WRAP-CA  Supervision-min A for recall, 5/5.    Sustained attention with retrieval competed with self doubt, Difficulty initiating, difficulty with trial and deductive reasoning.  Decreased processing speed- taking a long time.             Processing    See APT scores         Problem Solving/Reasoning (Money/Math, Safety , Multistep )     Time Management Completed Time management orientation with phases 9-12.  Continue to monitor and manage with fading cues.      Planning/Organization (Executive Functioning) Visual processing bood: p 189. Ex. 36 Maps  Anticipated task to be a 2- simple to moderate.  Completed 1 map task in 5 minutes, found error and demonstrated difficulty self correcting error.   Practiced stating goal, refocusing to goal, sustaining attention to goal.     Symptom  Management  Attention Fatigue Scale: 3 or less    PSFS: goal: 80% 4, 5, DC'd task before therapist could recommend a break.   Pt states often finds self to be in 4-6, at times higher.  3- encouraged removal.    Education/ Strategy Training Provided attention packet and attention fatigue scale.  Pt requesting processing exercises.  CT login: [email or web address]  PW: [email or web address]  Discussed [place], SET  Assigned Organized Mind to listen to- answer questions upon return. Did not complete   Other Reviewed sleep routines (11-7) and target goals, meal routines, water recommendations.     Practiced activities to practice at home.  CT, SET, joellen,   Dr. 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  November 1, part time- consider daily, decreased hours.     APT Tracking    Sustained attention  Date 9/16/19 10/1/2019 10/8/2019   Tasks              Ascending # (aud) 72% 3 effort  fast 100% 6 effort  slow 71% 7 effort  fast   Matching clocks (vis) 73% 5 effort  fast 100% 7 effort  slow 94% 8 effort fast       Alternating  attention  Date 10/22/2019     Tasks              Happy/Sad (auditory) 23% 9 effort  fast     Hi-mid-low (Visual) 56% 9 effort  fast

## 2019-11-12 ENCOUNTER — HOSPITAL ENCOUNTER (OUTPATIENT)
Dept: SPEECH THERAPY | Facility: REHABILITATION | Age: 49
Setting detail: THERAPIES SERIES
Discharge: HOME | End: 2019-11-12
Attending: PSYCHIATRY & NEUROLOGY
Payer: COMMERCIAL

## 2019-11-12 DIAGNOSIS — G31.84 MILD COGNITIVE IMPAIRMENT: Primary | ICD-10-CM

## 2019-11-12 PROCEDURE — 92507 TX SP LANG VOICE COMM INDIV: CPT

## 2019-11-12 NOTE — PROGRESS NOTES
SLP DISCHARGE NOTE FOR OUTPATIENT THERAPY    Patient: Sarah Beth Person   MRN: [id number]  : 1970 49 y.o.  Referring Physician: Maynor Mcdermott MD  Date of Visit: 2019      Certification Dates: 09/10/19 through 19    Total Visit Count: 15     Chief Complaints:   Chief Complaint   Patient presents with   • Cognition     attention        Precautions:      TODAY'S VISIT:    General Information - 19 1209        Session Details    Document Type  discharge evaluation     Mode of Treatment  individual therapy;speech language pathology     Patient/Family/Caregiver Comments/Observations  Pt reported returned to work on 2019, 20hr/wk -, full 8 hr day 19. Pt reports success sustained attention to work tasks w/o personal distraction.  Pt reports difficulty sleeping, max 6 hours, waking ~4-5 times a night, maximum of 3 hours strung together.  Continue to increase workload and work hours.         Time Calculation    Start Time  1200     Stop Time  1300     Time Calculation (min)  60 min         Pain and Vitals - 19 1208        Pain Assessment    Currently in pain  No/Denies        Pain Interventions    Intervention  none     Post Intervention Comments  none         Daily Falls Screen - 19 1216        Daily Falls Assessment    Patient reported fall since last visit  No         Daily Treatment Assessment and Plan - 19 1412        Daily Treatment Assessment and Plan    Progress toward goals  Progressing     Daily Outcome Summary  Pt continues to demonstrate some difficulty in auditory attention processing and memory.       Plan and Recommendations  Discharge.  Recommend follow up with neurologist.  Continue to use memory (WRAP-CA) and communication strategies (3-way repeat back and clarifying questions.  Modify tasks for cognitive endurance and accuracy.             OBJECTIVE MEASUREMENTS/DATA:    Cognition   Executive Function and Cognition - 19 1250         Executive Function Assessment    Executive Function Deficit (Cognition)  other (see comments)    STAC: 14/14, 80%tile    Comment  STAC: Language Fluency: 44%tile broad category, specific category: 10%tile        Memory    Immediate memory  RIPA-2: 29/30, >/91st%tile, HCLS- Immediate Memory for Complex Ideational Material: 6/10     Short term memory  STAC: Auditory- 0/3, 0%tile, visual- 2/3 9th%tile     Delayed memory  Immediate: 3/3, 10 min delay 1/3     Working memory  Visual: 74%tile and 79%tile,  Auditory: 35%tile        Problem Solving Assessment    Comment  Rating Scale for Processing:  pt reported MILD Problemss in PROCESSING: MODERATE Problems: able to think on her feet, getting tired easily, following a lot of verbal information, confusion when there is a lot going on,         Attention    Focused Attention  Results of Re-assessment of Attention Rating Scale (self-rating) indicate MILDly identified problems in overall ATTENTION at DISCHARGE:  Pt reports MILD PROBLEMS in FOCUSED ATTENTION     Sustained Attention  MILD to MODERATE PROBLEMS reported in SUSTAINED ATTENTION     Selective Attention  MILD PROBLEMS reported in SELECTIVE  ATTENTION     Alternating Attention  MILD PROBLEMS reported in ALTERNATING ATTENTION         Functional Comm Measures   Functional Communication Measures - 11/12/19 1414        Functional Communication Measures    FCM: Attention  5-->Level 5     FCM: Memory  6-->Level 6     FCM: Problem Solving  6-->Level 6           Today's Treatment::      APT Tracking    Sustained attention  Date 9/16/19 10/1/2019 10/8/2019 11/12/2019   Tasks               Ascending # (aud) 72% 3 effort  fast 100% 6 effort  slow 71% 7 effort  fast 78% 5 effort  fast   Matching clocks (vis) 73% 5 effort  fast 100% 7 effort  slow 94% 8 effort fast X     Standardized Touchscreen Assessment of Cognition (STAC)   Scores listed as percentile rankings  Domain: Scores:   Orientation 4/6   0%   Language Conf. Naming 3/3   60%   Imm. Recall 3/3  50%   Lang. Word Fluency (cat.) 14   44%   Lang. Word Fluency (let.) 9   10%   Vis. Scanning/spatial/attn 40/40  53%   STM aud. 0/3  0%   Aud. Sustained (20)  5/5  66%   Aud. Sustained (30)  9/15  7%   Visual Attention Imm. Recall 3/3  50%   Visual Attn Work Mem    • Forward 6/8  74%   • Reverse  5/8  79%   Memory (Reverse sent.) 5/9  35%   Attn. Executive Function  14/14  80%   STM vis. 2/3  9%   Temporal Orientation  0/1  0%   Percentile Ranking Key:   Above Average 77-95%, High end Average 53-75%, Average  39-50%, Low end Average 25-37%, Below Average (weakness) 10-23%, Poor  4-9%,Very Poor 0-3%      *Decreased language fluency with increased limitations, decreased auditory attention and auditory memory.  Suspect decrease auditory memory lead to decreased memory for reversed sentence.  Increased time for recall of visual stimulus for STM (visual). APT continues to support decreased auditory attention.  Continue to recommend communication strategies for 3 way repeat back and asking clarifying questions.  Continue to utilize memory strategies, particularly written planner and journal.        Patient Specific Functional Scale:   WORK:  9/10/10: 4  10/8/19: 6  11/12/19: 7    SOCIALIZATION:  9/10/10: 6  10/8/19: 8  11/12/19: 7    ENGAGING In TV:   9/10/10: 6  10/8/19: 6  11/12/19: 7    CARING FOR SON:   9/10/10: 5  10/8/19: 9  11/12/19: 7    OVERALL  9/10/10: 53% to same level as prior  10/8/19: 73% to same level as prior  11/12/19: 70% to same level as prior      Goals:    Goals Addressed                 This Visit's Progress    • Cognition               Goals Short-Long Time Frame Result Comment   Pt will demonstrate improved NOMS scores in:  Attention-6  Memory-7  Problem Solving-7    Pt will improve PSFS to >/=80%     Pt will report improved self-rating scores on the:  Rating scale for attention: from moderate to mild.     Rating score for processing from Mild to WNL     Pt will demonstrate  carryover of HEP    LTG                    LTG            LTG          LTG 12 w                    12 w            12 w  not met            Not met          Met          Not met Discharge:   Attention:5  Memory:6  Problem Solvin            Baseline: 53%  Discharge: 70%            Baseline: Attention Rating Scale: 2.9  Discharge: 1.9  Mild     Baseline: Processing Rating Scale:2.3 mild  Discharge: 2.1 Mild    CT   EDUCATION:  Pt will identify cognitive demands with level of attention required provided written handout and review, 80% of trials, supervision     Pt will report maintenance of a 3 or less over 2 weeks on the attention fatigue rating scale.      STG                    STG    3 w                   8 w    Met                Met    80% without handout, Supervision.                   MEMORY:  Pt will adapt a version of 12 phases of time management to improve memory of daily/weekly/monthly scheduled events, as support planning and execution for home tasks, supervision.     Pt will improve immediate memory for  repetition of complex sentences, 90% of trials, paragraph retell, 80% of detail inclusion.      Pt will demonstrate WM for mental calculations and manipulation tasks for moderate tasks, 90% of trials     Pt will demonstrate 30+ minutes delayed recall of named words or activities 90% of trials. STG                           STG                STG  4 w                         6 w                 8 w  Met  Increase use with return to work                        Complex Sentences- Met    Paragraph retell- not met          Visual WM- met    Auditory WM- not met        Not met- Requires use of WRAP-CA strategies.    ATTENTION:  Pt will complete moderate to complex sustained attention task and report 1-2 on: concentrating for a long period of time, Finding attention wanders more easily, getting mentally tired more easily, supervision    Pt will complete the above goal in distraction, supervision STG 6w  Not  Met Concentrating for long period: 2  Finding Attention Wanders: 3  Getting mentally tired: 3   PROCESSING:   Pt will complete activities for improved auditory processing as demonstrated by improvement in APT ascending fast to >/=85% and self report.   STG 8 w  Met- visual    Not met- auditory Baseline: visual sustained:  Match clocks(fast): 73%  Current: visual sustained:  Match clocks (fast): 94%    Baseline: auditory sustained:  Ascending (fast): 72%  Current: auditory sustained:  Ascending (fast): 78%                                                           Clinical Impression: Ms. Person presents at discharge with decreased language fluency with increased limitations, decreased auditory attention and auditory memory.  Suspect decrease auditory memory lead to decreased memory for reversed sentence.  Increased time for recall of visual stimulus for STM (visual). APT continues to support decreased auditory attention.  Continue to recommend communication strategies for 3 way repeat back and asking clarifying questions.  Continue to utilize memory strategies, particularly written planner and journal.      Discharge information for [facility]:    Reason for D/C: Maximum potential met  Hospitalization during treatment: No  Increased independence: Increased functional activity, [place] in HEP  Increased production assessment: Return to work/school/volunteer activities, Return to household activities  Interrupted for medical reason: No

## 2019-11-12 NOTE — OP SLP TREATMENT LOG
APT Tracking    Sustained attention  Date 9/16/19 10/1/2019 10/8/2019 11/12/2019   Tasks               Ascending # (aud) 72% 3 effort  fast 100% 6 effort  slow 71% 7 effort  fast 78% 5 effort  fast   Matching clocks (vis) 73% 5 effort  fast 100% 7 effort  slow 94% 8 effort fast X     Standardized Touchscreen Assessment of Cognition (STAC)   Scores listed as percentile rankings  Domain: Scores:   Orientation 4/6   0%   Language Conf. Naming 3/3  60%   Imm. Recall 3/3  50%   Lang. Word Fluency (cat.) 14   44%   Lang. Word Fluency (let.) 9   10%   Vis. Scanning/spatial/attn 40/40  53%   STM aud. 0/3  0%   Aud. Sustained (20)  5/5  66%   Aud. Sustained (30)  9/15  7%   Visual Attention Imm. Recall 3/3  50%   Visual Attn Work Mem    • Forward 6/8  74%   • Reverse  5/8  79%   Memory (Reverse sent.) 5/9  35%   Attn. Executive Function  14/14  80%   STM vis. 2/3  9%   Temporal Orientation  0/1  0%   Percentile Ranking Key:   Above Average 77-95%, High end Average 53-75%, Average  39-50%, Low end Average 25-37%, Below Average (weakness) 10-23%, Poor  4-9%,Very Poor 0-3%      *Decreased language fluency with increased limitations, decreased auditory attention and auditory memory.  Suspect decrease auditory memory lead to decreased memory for reversed sentence.  Increased time for recall of visual stimulus for STM (visual). APT continues to support decreased auditory attention.  Continue to recommend communication strategies for 3 way repeat back and asking clarifying questions.  Continue to utilize memory strategies, particularly written planner and journal.        Patient Specific Functional Scale:   WORK:  9/10/10: 4  10/8/19: 6  11/12/19: 7    SOCIALIZATION:  9/10/10: 6  10/8/19: 8  11/12/19: 7    ENGAGING In TV:   9/10/10: 6  10/8/19: 6  11/12/19: 7    CARING FOR SON:   9/10/10: 5  10/8/19: 9  11/12/19: 7    OVERALL  9/10/10: 53% to same level as prior  10/8/19: 73% to same level as prior  11/12/19: 70% to same level as  prior

## 2019-11-12 NOTE — LETTER
[address] [place] [address]  [phone]  [facility] Fax: [phone]    SPEECH THERAPY DISCHARGE    Patient Name: Sarah Beth Person    Provider: Rosalinda Lilly CCC-SLP  Referring Provider: Maynor Mcdermott MD  PCP: Angelina Naqvi  Payor: Payor: [facility] / Plan: [employer] / Product Type: Commercial /   Medical Diagnosis: Mild cognitive impairment [G31.84]     Dear Dr. Mcdermott,    Thank you for the referral of your patient Sarah Beth Person for speech therapy. I am writing to you today to make you aware that your patient is being discharged from speech therapy. Please review the latest progress note below and the goals that have been addressed during this plan of care.     If you have any questions or concerns, please feel free to contact me. Once again, thank you for your referral and the opportunity to work with your patient.      Sincerely,  Rosalinda Lilly CCC-SLP      SLP DISCHARGE NOTE FOR OUTPATIENT THERAPY    Patient: Sarah Beth Person   MRN: [id number]  : 1970 49 y.o.  Referring Physician: Maynor Mcdermott MD  Date of Visit: 2019      Certification Dates: 09/10/19 through 19    Total Visit Count: 15     Chief Complaints:   Chief Complaint   Patient presents with   • Cognition     attention        Precautions:      TODAY'S VISIT:    General Information - 19 1209        Session Details    Document Type  discharge evaluation     Mode of Treatment  individual therapy;speech language pathology     Patient/Family/Caregiver Comments/Observations  Pt reported returned to work on 2019, 20hr/wk -, full 8 hr day 19. Pt reports success sustained attention to work tasks w/o personal distraction.  Pt reports difficulty sleeping, max 6 hours, waking ~4-5 times a night, maximum of 3 hours strung together.  Continue to increase workload and work hours.         Time Calculation    Start Time  1200     Stop Time  1300     Time Calculation  (min)  60 min         Pain and Vitals - 11/12/19 1208        Pain Assessment    Currently in pain  No/Denies        Pain Interventions    Intervention  none     Post Intervention Comments  none         Daily Falls Screen - 11/12/19 1216        Daily Falls Assessment    Patient reported fall since last visit  No         Daily Treatment Assessment and Plan - 11/12/19 1412        Daily Treatment Assessment and Plan    Progress toward goals  Progressing     Daily Outcome Summary  Pt continues to demonstrate some difficulty in auditory attention processing and memory.       Plan and Recommendations  Discharge.  Recommend follow up with neurologist.  Continue to use memory (WRAP-CA) and communication strategies (3-way repeat back and clarifying questions.  Modify tasks for cognitive endurance and accuracy.             OBJECTIVE MEASUREMENTS/DATA:    Cognition   Executive Function and Cognition - 11/12/19 1250        Executive Function Assessment    Executive Function Deficit (Cognition)  other (see comments)    STAC: 14/14, 80%tile    Comment  STAC: Language Fluency: 44%tile broad category, specific category: 10%tile        Memory    Immediate memory  RIPA-2: 29/30, >/91st%tile, HCLS- Immediate Memory for Complex Ideational Material: 6/10     Short term memory  STAC: Auditory- 0/3, 0%tile, visual- 2/3 9th%tile     Delayed memory  Immediate: 3/3, 10 min delay 1/3     Working memory  Visual: 74%tile and 79%tile,  Auditory: 35%tile        Problem Solving Assessment    Comment  Rating Scale for Processing:  pt reported MILD Problemss in PROCESSING: MODERATE Problems: able to think on her feet, getting tired easily, following a lot of verbal information, confusion when there is a lot going on,         Attention    Focused Attention  Results of Re-assessment of Attention Rating Scale (self-rating) indicate MILDly identified problems in overall ATTENTION at DISCHARGE:  Pt reports MILD PROBLEMS in FOCUSED ATTENTION     Sustained  Attention  MILD to MODERATE PROBLEMS reported in SUSTAINED ATTENTION     Selective Attention  MILD PROBLEMS reported in SELECTIVE  ATTENTION     Alternating Attention  MILD PROBLEMS reported in ALTERNATING ATTENTION         Functional Comm Measures   Functional Communication Measures - 11/12/19 1414        Functional Communication Measures    FCM: Attention  5-->Level 5     FCM: Memory  6-->Level 6     FCM: Problem Solving  6-->Level 6           Today's Treatment::      APT Tracking    Sustained attention  Date 9/16/19 10/1/2019 10/8/2019 11/12/2019   Tasks               Ascending # (aud) 72% 3 effort  fast 100% 6 effort  slow 71% 7 effort  fast 78% 5 effort  fast   Matching clocks (vis) 73% 5 effort  fast 100% 7 effort  slow 94% 8 effort fast X     Standardized Touchscreen Assessment of Cognition (STAC)   Scores listed as percentile rankings  Domain: Scores:   Orientation 4/6   0%   Language Conf. Naming 3/3  60%   Imm. Recall 3/3  50%   Lang. Word Fluency (cat.) 14   44%   Lang. Word Fluency (let.) 9   10%   Vis. Scanning/spatial/attn 40/40  53%   STM aud. 0/3  0%   Aud. Sustained (20)  5/5  66%   Aud. Sustained (30)  9/15  7%   Visual Attention Imm. Recall 3/3  50%   Visual Attn Work Mem    • Forward 6/8  74%   • Reverse  5/8  79%   Memory (Reverse sent.) 5/9  35%   Attn. Executive Function  14/14  80%   STM vis. 2/3  9%   Temporal Orientation  0/1  0%   Percentile Ranking Key:   Above Average 77-95%, High end Average 53-75%, Average  39-50%, Low end Average 25-37%, Below Average (weakness) 10-23%, Poor  4-9%,Very Poor 0-3%      *Decreased language fluency with increased limitations, decreased auditory attention and auditory memory.  Suspect decrease auditory memory lead to decreased memory for reversed sentence.  Increased time for recall of visual stimulus for STM (visual). APT continues to support decreased auditory attention.  Continue to recommend communication strategies for 3 way repeat back and asking  clarifying questions.  Continue to utilize memory strategies, particularly written planner and journal.        Patient Specific Functional Scale:   WORK:  9/10/10: 4  10/8/19: 6  19: 7    SOCIALIZATION:  9/10/10: 6  10/8/19: 8  19: 7    ENGAGING In TV:   9/10/10: 6  10/8/19: 6  19: 7    CARING FOR SON:   9/10/10: 5  10/8/19: 9  19: 7    OVERALL  9/10/10: 53% to same level as prior  10/8/19: 73% to same level as prior  19: 70% to same level as prior      Goals:    Goals Addressed                 This Visit's Progress    • Cognition               Goals Short-Long Time Frame Result Comment   Pt will demonstrate improved NOMS scores in:  Attention-6  Memory-7  Problem Solving-7    Pt will improve PSFS to >/=80%     Pt will report improved self-rating scores on the:  Rating scale for attention: from moderate to mild.     Rating score for processing from Mild to WNL     Pt will demonstrate carryover of HEP    LTG                    LTG            LTG          LTG 12 w                    12 w            12 w  not met            Not met          Met          Not met Discharge:   Attention:5  Memory:6  Problem Solvin            Baseline: 53%  Discharge: 70%            Baseline: Attention Rating Scale: 2.9  Discharge: 1.9  Mild     Baseline: Processing Rating Scale:2.3 mild  Discharge: 2.1 Mild    CT   EDUCATION:  Pt will identify cognitive demands with level of attention required provided written handout and review, 80% of trials, supervision     Pt will report maintenance of a 3 or less over 2 weeks on the attention fatigue rating scale.      STG                    STG    3 w                   8 w    Met                Met    80% without handout, Supervision.                   MEMORY:  Pt will adapt a version of 12 phases of time management to improve memory of daily/weekly/monthly scheduled events, as support planning and execution for home tasks, supervision.     Pt will improve immediate  memory for  repetition of complex sentences, 90% of trials, paragraph retell, 80% of detail inclusion.      Pt will demonstrate WM for mental calculations and manipulation tasks for moderate tasks, 90% of trials     Pt will demonstrate 30+ minutes delayed recall of named words or activities 90% of trials. STG                           STG                STG  4 w                         6 w                 8 w  Met  Increase use with return to work                        Complex Sentences- Met    Paragraph retell- not met          Visual WM- met    Auditory WM- not met        Not met- Requires use of WRAP-CA strategies.    ATTENTION:  Pt will complete moderate to complex sustained attention task and report 1-2 on: concentrating for a long period of time, Finding attention wanders more easily, getting mentally tired more easily, supervision    Pt will complete the above goal in distraction, supervision STG 6w  Not Met Concentrating for long period: 2  Finding Attention Wanders: 3  Getting mentally tired: 3   PROCESSING:   Pt will complete activities for improved auditory processing as demonstrated by improvement in APT ascending fast to >/=85% and self report.   STG 8 w  Met- visual    Not met- auditory Baseline: visual sustained:  Match clocks(fast): 73%  Current: visual sustained:  Match clocks (fast): 94%    Baseline: auditory sustained:  Ascending (fast): 72%  Current: auditory sustained:  Ascending (fast): 78%                                                           Clinical Impression: Ms. Person presents at discharge with decreased language fluency with increased limitations, decreased auditory attention and auditory memory.  Suspect decrease auditory memory lead to decreased memory for reversed sentence.  Increased time for recall of visual stimulus for STM (visual). APT continues to support decreased auditory attention.  Continue to recommend communication strategies for 3 way repeat back and asking  clarifying questions.  Continue to utilize memory strategies, particularly written planner and journal.      Discharge information for [facility]:    Reason for D/C: Maximum potential met  Hospitalization during treatment: No  Increased independence: Increased functional activity, [place] in HEP  Increased production assessment: Return to work/school/volunteer activities, Return to household activities  Interrupted for medical reason: No

## 2020-03-31 ENCOUNTER — HOSPITAL ENCOUNTER (EMERGENCY)
Facility: HOSPITAL | Age: 50
Discharge: HOME | End: 2020-03-31
Attending: EMERGENCY MEDICINE | Admitting: EMERGENCY MEDICINE
Payer: COMMERCIAL

## 2020-03-31 ENCOUNTER — APPOINTMENT (EMERGENCY)
Dept: RADIOLOGY | Facility: HOSPITAL | Age: 50
End: 2020-03-31
Payer: COMMERCIAL

## 2020-03-31 VITALS
DIASTOLIC BLOOD PRESSURE: 60 MMHG | OXYGEN SATURATION: 100 % | TEMPERATURE: 97.7 F | SYSTOLIC BLOOD PRESSURE: 106 MMHG | RESPIRATION RATE: 22 BRPM | WEIGHT: 145 LBS | HEART RATE: 87 BPM

## 2020-03-31 DIAGNOSIS — S52.613A FRACTURE OF ULNAR STYLOID: ICD-10-CM

## 2020-03-31 DIAGNOSIS — S52.509A DISTAL RADIUS FRACTURE: ICD-10-CM

## 2020-03-31 DIAGNOSIS — S69.91XA INJURY OF RIGHT WRIST, INITIAL ENCOUNTER: Primary | ICD-10-CM

## 2020-03-31 PROCEDURE — 73110 X-RAY EXAM OF WRIST: CPT

## 2020-03-31 PROCEDURE — 99284 EMERGENCY DEPT VISIT MOD MDM: CPT | Performed by: EMERGENCY MEDICINE

## 2020-03-31 PROCEDURE — 25605 CLTX DST RDL FX/EPHYS SEP W/: CPT | Performed by: EMERGENCY MEDICINE

## 2020-03-31 PROCEDURE — 99283 EMERGENCY DEPT VISIT LOW MDM: CPT

## 2020-03-31 RX ORDER — MIRTAZAPINE 15 MG/1
15 TABLET, FILM COATED ORAL
COMMUNITY

## 2020-03-31 RX ORDER — IBUPROFEN 400 MG/1
400 TABLET ORAL ONCE
Status: COMPLETED | OUTPATIENT
Start: 2020-03-31 | End: 2020-03-31

## 2020-03-31 RX ORDER — LIDOCAINE HYDROCHLORIDE 10 MG/ML
10 INJECTION, SOLUTION EPIDURAL; INFILTRATION; INTRACAUDAL; PERINEURAL ONCE
Status: COMPLETED | OUTPATIENT
Start: 2020-03-31 | End: 2020-03-31

## 2020-03-31 RX ORDER — ESCITALOPRAM OXALATE 10 MG/1
5 TABLET ORAL DAILY
COMMUNITY

## 2020-03-31 RX ADMIN — LIDOCAINE HYDROCHLORIDE 10 ML: 10 INJECTION, SOLUTION EPIDURAL; INFILTRATION; INTRACAUDAL; PERINEURAL at 11:52

## 2020-03-31 RX ADMIN — IBUPROFEN 400 MG: 400 TABLET ORAL at 11:21

## 2020-04-03 ENCOUNTER — HOSPITAL ENCOUNTER (OUTPATIENT)
Dept: RADIOLOGY | Facility: HOSPITAL | Age: 50
Discharge: HOME | End: 2020-04-03
Attending: SURGERY
Payer: COMMERCIAL

## 2020-04-03 ENCOUNTER — OFFICE VISIT (OUTPATIENT)
Dept: OBGYN CLINIC | Facility: HOSPITAL | Age: 50
End: 2020-04-03
Payer: COMMERCIAL

## 2020-04-03 VITALS
HEIGHT: 66 IN | DIASTOLIC BLOOD PRESSURE: 81 MMHG | BODY MASS INDEX: 23.3 KG/M2 | HEART RATE: 86 BPM | SYSTOLIC BLOOD PRESSURE: 138 MMHG | WEIGHT: 145 LBS

## 2020-04-03 DIAGNOSIS — R20.2 NUMBNESS AND TINGLING IN RIGHT HAND: ICD-10-CM

## 2020-04-03 DIAGNOSIS — S62.001A CLOSED NONDISPLACED FRACTURE OF SCAPHOID OF RIGHT WRIST, UNSPECIFIED PORTION OF SCAPHOID, INITIAL ENCOUNTER: ICD-10-CM

## 2020-04-03 DIAGNOSIS — S52.501A CLOSED FRACTURE OF DISTAL ENDS OF RIGHT RADIUS AND ULNA, INITIAL ENCOUNTER: ICD-10-CM

## 2020-04-03 DIAGNOSIS — S52.501A CLOSED FRACTURE OF DISTAL ENDS OF RIGHT RADIUS AND ULNA, INITIAL ENCOUNTER: Primary | ICD-10-CM

## 2020-04-03 DIAGNOSIS — S52.601A CLOSED FRACTURE OF DISTAL ENDS OF RIGHT RADIUS AND ULNA, INITIAL ENCOUNTER: Primary | ICD-10-CM

## 2020-04-03 DIAGNOSIS — Z01.818 PRE-OP TESTING: ICD-10-CM

## 2020-04-03 DIAGNOSIS — S52.601A CLOSED FRACTURE OF DISTAL ENDS OF RIGHT RADIUS AND ULNA, INITIAL ENCOUNTER: ICD-10-CM

## 2020-04-03 DIAGNOSIS — R20.0 NUMBNESS AND TINGLING IN RIGHT HAND: ICD-10-CM

## 2020-04-03 PROCEDURE — 73110 X-RAY EXAM OF WRIST: CPT

## 2020-04-03 PROCEDURE — 99204 OFFICE O/P NEW MOD 45 MIN: CPT | Performed by: SURGERY

## 2020-04-03 RX ORDER — CLINDAMYCIN PHOSPHATE 900 MG/50ML
900 INJECTION INTRAVENOUS ONCE
Status: CANCELLED | OUTPATIENT
Start: 2020-04-03 | End: 2020-04-03

## 2020-04-04 ENCOUNTER — HOSPITAL ENCOUNTER (OUTPATIENT)
Dept: CT IMAGING | Facility: HOSPITAL | Age: 50
Discharge: HOME | End: 2020-04-04
Attending: SURGERY
Payer: COMMERCIAL

## 2020-04-04 DIAGNOSIS — S62.001A CLOSED NONDISPLACED FRACTURE OF SCAPHOID OF RIGHT WRIST, UNSPECIFIED PORTION OF SCAPHOID, INITIAL ENCOUNTER: ICD-10-CM

## 2020-04-04 DIAGNOSIS — S52.501A CLOSED FRACTURE OF DISTAL ENDS OF RIGHT RADIUS AND ULNA, INITIAL ENCOUNTER: ICD-10-CM

## 2020-04-04 DIAGNOSIS — Z01.818 PRE-OP TESTING: ICD-10-CM

## 2020-04-04 DIAGNOSIS — S52.601A CLOSED FRACTURE OF DISTAL ENDS OF RIGHT RADIUS AND ULNA, INITIAL ENCOUNTER: ICD-10-CM

## 2020-04-04 PROCEDURE — 73200 CT UPPER EXTREMITY W/O DYE: CPT

## 2020-04-06 ENCOUNTER — ANESTHESIA EVENT (OUTPATIENT)
Dept: PERIOP | Facility: HOSPITAL | Age: 50
End: 2020-04-06
Payer: COMMERCIAL

## 2020-04-07 ENCOUNTER — ANESTHESIA (OUTPATIENT)
Dept: PERIOP | Facility: HOSPITAL | Age: 50
End: 2020-04-07
Payer: COMMERCIAL

## 2020-04-07 ENCOUNTER — HOSPITAL ENCOUNTER (OUTPATIENT)
Facility: HOSPITAL | Age: 50
Setting detail: OUTPATIENT SURGERY
Discharge: HOME | End: 2020-04-07
Attending: SURGERY | Admitting: SURGERY
Payer: COMMERCIAL

## 2020-04-07 ENCOUNTER — APPOINTMENT (OUTPATIENT)
Dept: RADIOLOGY | Facility: HOSPITAL | Age: 50
End: 2020-04-07
Payer: COMMERCIAL

## 2020-04-07 VITALS
HEART RATE: 77 BPM | HEIGHT: 66 IN | TEMPERATURE: 98.5 F | WEIGHT: 145 LBS | SYSTOLIC BLOOD PRESSURE: 133 MMHG | OXYGEN SATURATION: 95 % | DIASTOLIC BLOOD PRESSURE: 74 MMHG | RESPIRATION RATE: 18 BRPM | BODY MASS INDEX: 23.3 KG/M2

## 2020-04-07 DIAGNOSIS — S52.571D OTHER CLOSED INTRA-ARTICULAR FRACTURE OF DISTAL END OF RIGHT RADIUS WITH ROUTINE HEALING, SUBSEQUENT ENCOUNTER: Primary | ICD-10-CM

## 2020-04-07 LAB
EXT PREGNANCY TEST URINE: NEGATIVE
EXT. CONTROL: NORMAL

## 2020-04-07 PROCEDURE — C1713 ANCHOR/SCREW BN/BN,TIS/BN: HCPCS | Performed by: SURGERY

## 2020-04-07 PROCEDURE — 82652 VIT D 1 25-DIHYDROXY: CPT | Performed by: SURGERY

## 2020-04-07 PROCEDURE — 81025 URINE PREGNANCY TEST: CPT | Performed by: STUDENT IN AN ORGANIZED HEALTH CARE EDUCATION/TRAINING PROGRAM

## 2020-04-07 PROCEDURE — 25609 OPTX DST RD XART FX/EP SEP3+: CPT | Performed by: SURGERY

## 2020-04-07 PROCEDURE — 64721 CARPAL TUNNEL SURGERY: CPT | Performed by: SURGERY

## 2020-04-07 PROCEDURE — 77071 MNL APPL STRS JT RADIOGRAPHY: CPT | Performed by: SURGERY

## 2020-04-07 PROCEDURE — 73100 X-RAY EXAM OF WRIST: CPT

## 2020-04-07 DEVICE — PEG VOLAR THREADED 18MM: Type: IMPLANTABLE DEVICE | Site: WRIST | Status: FUNCTIONAL

## 2020-04-07 DEVICE — PEG VOLAR 20MM UNTHREADED: Type: IMPLANTABLE DEVICE | Site: WRIST | Status: FUNCTIONAL

## 2020-04-07 DEVICE — SCREW LCK 3.2 X 12MM CORTICAL: Type: IMPLANTABLE DEVICE | Site: WRIST | Status: FUNCTIONAL

## 2020-04-07 DEVICE — SCREW CORTICAL 3.2 X 11MM: Type: IMPLANTABLE DEVICE | Site: WRIST | Status: FUNCTIONAL

## 2020-04-07 DEVICE — PEG VOLAR 18MM UNTHREADED: Type: IMPLANTABLE DEVICE | Site: WRIST | Status: FUNCTIONAL

## 2020-04-07 DEVICE — PLATE VOLAR BEARING 3 HOLE RIGHT: Type: IMPLANTABLE DEVICE | Site: WRIST | Status: FUNCTIONAL

## 2020-04-07 RX ORDER — MEPERIDINE HYDROCHLORIDE 25 MG/ML
12.5 INJECTION INTRAMUSCULAR; INTRAVENOUS; SUBCUTANEOUS ONCE AS NEEDED
Status: DISCONTINUED | OUTPATIENT
Start: 2020-04-07 | End: 2020-04-07 | Stop reason: HOSPADM

## 2020-04-07 RX ORDER — GLYCOPYRROLATE 0.2 MG/ML
INJECTION INTRAMUSCULAR; INTRAVENOUS AS NEEDED
Status: DISCONTINUED | OUTPATIENT
Start: 2020-04-07 | End: 2020-04-07 | Stop reason: SURG

## 2020-04-07 RX ORDER — SUCCINYLCHOLINE/SOD CL,ISO/PF 100 MG/5ML
SYRINGE (ML) INTRAVENOUS AS NEEDED
Status: DISCONTINUED | OUTPATIENT
Start: 2020-04-07 | End: 2020-04-07 | Stop reason: SURG

## 2020-04-07 RX ORDER — PROPOFOL 10 MG/ML
INJECTION, EMULSION INTRAVENOUS AS NEEDED
Status: DISCONTINUED | OUTPATIENT
Start: 2020-04-07 | End: 2020-04-07 | Stop reason: SURG

## 2020-04-07 RX ORDER — CLINDAMYCIN PHOSPHATE 900 MG/50ML
900 INJECTION INTRAVENOUS ONCE
Status: COMPLETED | OUTPATIENT
Start: 2020-04-07 | End: 2020-04-07

## 2020-04-07 RX ORDER — ONDANSETRON 2 MG/ML
4 INJECTION INTRAMUSCULAR; INTRAVENOUS ONCE AS NEEDED
Status: DISCONTINUED | OUTPATIENT
Start: 2020-04-07 | End: 2020-04-07 | Stop reason: HOSPADM

## 2020-04-07 RX ORDER — PROPOFOL 10 MG/ML
INJECTION, EMULSION INTRAVENOUS CONTINUOUS PRN
Status: DISCONTINUED | OUTPATIENT
Start: 2020-04-07 | End: 2020-04-07 | Stop reason: SURG

## 2020-04-07 RX ORDER — ROPIVACAINE HYDROCHLORIDE 5 MG/ML
INJECTION, SOLUTION EPIDURAL; INFILTRATION; PERINEURAL
Status: COMPLETED | OUTPATIENT
Start: 2020-04-07 | End: 2020-04-07

## 2020-04-07 RX ORDER — ONDANSETRON 2 MG/ML
INJECTION INTRAMUSCULAR; INTRAVENOUS AS NEEDED
Status: DISCONTINUED | OUTPATIENT
Start: 2020-04-07 | End: 2020-04-07 | Stop reason: SURG

## 2020-04-07 RX ORDER — SODIUM CHLORIDE, SODIUM LACTATE, POTASSIUM CHLORIDE, CALCIUM CHLORIDE 600; 310; 30; 20 MG/100ML; MG/100ML; MG/100ML; MG/100ML
INJECTION, SOLUTION INTRAVENOUS CONTINUOUS PRN
Status: DISCONTINUED | OUTPATIENT
Start: 2020-04-07 | End: 2020-04-07 | Stop reason: SURG

## 2020-04-07 RX ORDER — MAGNESIUM HYDROXIDE 1200 MG/15ML
LIQUID ORAL AS NEEDED
Status: DISCONTINUED | OUTPATIENT
Start: 2020-04-07 | End: 2020-04-07 | Stop reason: HOSPADM

## 2020-04-07 RX ORDER — HYDROCODONE BITARTRATE AND ACETAMINOPHEN 5; 325 MG/1; MG/1
1 TABLET ORAL EVERY 6 HOURS PRN
Qty: 20 TABLET | Refills: 0 | Status: CANCELLED | OUTPATIENT
Start: 2020-04-07 | End: 2020-04-17

## 2020-04-07 RX ORDER — SODIUM CHLORIDE, SODIUM LACTATE, POTASSIUM CHLORIDE, CALCIUM CHLORIDE 600; 310; 30; 20 MG/100ML; MG/100ML; MG/100ML; MG/100ML
100 INJECTION, SOLUTION INTRAVENOUS CONTINUOUS
Status: DISCONTINUED | OUTPATIENT
Start: 2020-04-07 | End: 2020-04-07 | Stop reason: HOSPADM

## 2020-04-07 RX ORDER — FENTANYL CITRATE 50 UG/ML
INJECTION, SOLUTION INTRAMUSCULAR; INTRAVENOUS AS NEEDED
Status: DISCONTINUED | OUTPATIENT
Start: 2020-04-07 | End: 2020-04-07 | Stop reason: SURG

## 2020-04-07 RX ORDER — HYDROCODONE BITARTRATE AND ACETAMINOPHEN 5; 325 MG/1; MG/1
1 TABLET ORAL EVERY 6 HOURS PRN
Qty: 20 TABLET | Refills: 0 | Status: SHIPPED | OUTPATIENT
Start: 2020-04-07 | End: 2020-04-17

## 2020-04-07 RX ORDER — DEXAMETHASONE SODIUM PHOSPHATE 10 MG/ML
INJECTION, SOLUTION INTRAMUSCULAR; INTRAVENOUS AS NEEDED
Status: DISCONTINUED | OUTPATIENT
Start: 2020-04-07 | End: 2020-04-07 | Stop reason: SURG

## 2020-04-07 RX ORDER — ROCURONIUM BROMIDE 10 MG/ML
INJECTION, SOLUTION INTRAVENOUS AS NEEDED
Status: DISCONTINUED | OUTPATIENT
Start: 2020-04-07 | End: 2020-04-07 | Stop reason: SURG

## 2020-04-07 RX ORDER — HYDROMORPHONE HCL/PF 1 MG/ML
SYRINGE (ML) INJECTION AS NEEDED
Status: DISCONTINUED | OUTPATIENT
Start: 2020-04-07 | End: 2020-04-07 | Stop reason: SURG

## 2020-04-07 RX ORDER — LIDOCAINE HYDROCHLORIDE 10 MG/ML
INJECTION, SOLUTION EPIDURAL; INFILTRATION; INTRACAUDAL; PERINEURAL AS NEEDED
Status: DISCONTINUED | OUTPATIENT
Start: 2020-04-07 | End: 2020-04-07 | Stop reason: SURG

## 2020-04-07 RX ORDER — MIDAZOLAM HYDROCHLORIDE 2 MG/2ML
INJECTION, SOLUTION INTRAMUSCULAR; INTRAVENOUS AS NEEDED
Status: DISCONTINUED | OUTPATIENT
Start: 2020-04-07 | End: 2020-04-07 | Stop reason: SURG

## 2020-04-07 RX ORDER — ALBUTEROL SULFATE 2.5 MG/3ML
2.5 SOLUTION RESPIRATORY (INHALATION) ONCE AS NEEDED
Status: DISCONTINUED | OUTPATIENT
Start: 2020-04-07 | End: 2020-04-07 | Stop reason: HOSPADM

## 2020-04-07 RX ORDER — FENTANYL CITRATE/PF 50 MCG/ML
50 SYRINGE (ML) INJECTION
Status: DISCONTINUED | OUTPATIENT
Start: 2020-04-07 | End: 2020-04-07 | Stop reason: HOSPADM

## 2020-04-07 RX ADMIN — PROPOFOL 100 MCG/KG/MIN: 10 INJECTION, EMULSION INTRAVENOUS at 14:00

## 2020-04-07 RX ADMIN — ROCURONIUM BROMIDE 15 MG: 10 SOLUTION INTRAVENOUS at 14:46

## 2020-04-07 RX ADMIN — SODIUM CHLORIDE, SODIUM LACTATE, POTASSIUM CHLORIDE, AND CALCIUM CHLORIDE: .6; .31; .03; .02 INJECTION, SOLUTION INTRAVENOUS at 12:55

## 2020-04-07 RX ADMIN — SUGAMMADEX 200 MG: 100 INJECTION, SOLUTION INTRAVENOUS at 15:43

## 2020-04-07 RX ADMIN — MIDAZOLAM HYDROCHLORIDE 2 MG: 1 INJECTION, SOLUTION INTRAMUSCULAR; INTRAVENOUS at 13:08

## 2020-04-07 RX ADMIN — SODIUM CHLORIDE, SODIUM LACTATE, POTASSIUM CHLORIDE, AND CALCIUM CHLORIDE: .6; .31; .03; .02 INJECTION, SOLUTION INTRAVENOUS at 15:33

## 2020-04-07 RX ADMIN — ROCURONIUM BROMIDE 25 MG: 10 SOLUTION INTRAVENOUS at 14:10

## 2020-04-07 RX ADMIN — PROPOFOL 200 MG: 10 INJECTION, EMULSION INTRAVENOUS at 13:57

## 2020-04-07 RX ADMIN — Medication 100 MG: at 13:57

## 2020-04-07 RX ADMIN — DEXAMETHASONE SODIUM PHOSPHATE 8 MG: 10 INJECTION, SOLUTION INTRAMUSCULAR; INTRAVENOUS at 14:00

## 2020-04-07 RX ADMIN — HYDROMORPHONE HYDROCHLORIDE 0.5 MG: 1 INJECTION, SOLUTION INTRAMUSCULAR; INTRAVENOUS; SUBCUTANEOUS at 15:23

## 2020-04-07 RX ADMIN — ONDANSETRON 4 MG: 2 INJECTION INTRAMUSCULAR; INTRAVENOUS at 14:00

## 2020-04-07 RX ADMIN — FENTANYL CITRATE 50 MCG: 50 INJECTION INTRAMUSCULAR; INTRAVENOUS at 14:10

## 2020-04-07 RX ADMIN — ROPIVACAINE HYDROCHLORIDE 30 ML: 5 INJECTION, SOLUTION EPIDURAL; INFILTRATION; PERINEURAL at 13:14

## 2020-04-07 RX ADMIN — CLINDAMYCIN PHOSPHATE 900 MG: 18 INJECTION, SOLUTION INTRAMUSCULAR; INTRAVENOUS at 13:50

## 2020-04-07 RX ADMIN — FENTANYL CITRATE 50 MCG: 50 INJECTION INTRAMUSCULAR; INTRAVENOUS at 13:08

## 2020-04-07 RX ADMIN — GLYCOPYRROLATE 0.2 MG: 0.2 INJECTION, SOLUTION INTRAMUSCULAR; INTRAVENOUS at 13:57

## 2020-04-07 RX ADMIN — LIDOCAINE HYDROCHLORIDE 50 MG: 10 INJECTION, SOLUTION EPIDURAL; INFILTRATION; INTRACAUDAL; PERINEURAL at 13:57

## 2020-04-09 LAB — 1,25(OH)2D3 SERPL-MCNC: 22.9 PG/ML (ref 19.9–79.3)

## 2020-04-17 ENCOUNTER — HOSPITAL ENCOUNTER (OUTPATIENT)
Dept: RADIOLOGY | Facility: HOSPITAL | Age: 50
Discharge: HOME | End: 2020-04-17
Attending: SURGERY
Payer: COMMERCIAL

## 2020-04-17 ENCOUNTER — OFFICE VISIT (OUTPATIENT)
Dept: OCCUPATIONAL THERAPY | Facility: HOSPITAL | Age: 50
End: 2020-04-17

## 2020-04-17 ENCOUNTER — OFFICE VISIT (OUTPATIENT)
Dept: OBGYN CLINIC | Facility: HOSPITAL | Age: 50
End: 2020-04-17

## 2020-04-17 VITALS
BODY MASS INDEX: 23.3 KG/M2 | HEART RATE: 77 BPM | WEIGHT: 145 LBS | HEIGHT: 66 IN | DIASTOLIC BLOOD PRESSURE: 87 MMHG | SYSTOLIC BLOOD PRESSURE: 138 MMHG

## 2020-04-17 DIAGNOSIS — S52.601A CLOSED FRACTURE OF DISTAL ENDS OF RIGHT RADIUS AND ULNA, INITIAL ENCOUNTER: ICD-10-CM

## 2020-04-17 DIAGNOSIS — S52.501A CLOSED FRACTURE OF DISTAL ENDS OF RIGHT RADIUS AND ULNA, INITIAL ENCOUNTER: ICD-10-CM

## 2020-04-17 DIAGNOSIS — S52.601A CLOSED FRACTURE OF DISTAL ENDS OF RIGHT RADIUS AND ULNA, INITIAL ENCOUNTER: Primary | ICD-10-CM

## 2020-04-17 DIAGNOSIS — S52.501A CLOSED FRACTURE OF DISTAL ENDS OF RIGHT RADIUS AND ULNA, INITIAL ENCOUNTER: Primary | ICD-10-CM

## 2020-04-17 DIAGNOSIS — S62.101D CLOSED FRACTURE OF RIGHT WRIST WITH ROUTINE HEALING, SUBSEQUENT ENCOUNTER: Primary | ICD-10-CM

## 2020-04-17 PROCEDURE — 99024 POSTOP FOLLOW-UP VISIT: CPT | Performed by: SURGERY

## 2020-04-17 PROCEDURE — 73110 X-RAY EXAM OF WRIST: CPT

## 2020-04-23 ENCOUNTER — EVALUATION (OUTPATIENT)
Dept: OCCUPATIONAL THERAPY | Facility: MEDICAL CENTER | Age: 50
End: 2020-04-23
Payer: COMMERCIAL

## 2020-04-23 DIAGNOSIS — S52.501A CLOSED FRACTURE OF DISTAL ENDS OF RIGHT RADIUS AND ULNA, INITIAL ENCOUNTER: ICD-10-CM

## 2020-04-23 DIAGNOSIS — S52.601A CLOSED FRACTURE OF DISTAL ENDS OF RIGHT RADIUS AND ULNA, INITIAL ENCOUNTER: ICD-10-CM

## 2020-04-23 PROCEDURE — 97165 OT EVAL LOW COMPLEX 30 MIN: CPT

## 2020-04-23 PROCEDURE — 97140 MANUAL THERAPY 1/> REGIONS: CPT

## 2020-04-27 ENCOUNTER — OFFICE VISIT (OUTPATIENT)
Dept: OCCUPATIONAL THERAPY | Facility: MEDICAL CENTER | Age: 50
End: 2020-04-27
Payer: COMMERCIAL

## 2020-04-27 DIAGNOSIS — S52.601A CLOSED FRACTURE OF DISTAL ENDS OF RIGHT RADIUS AND ULNA, INITIAL ENCOUNTER: Primary | ICD-10-CM

## 2020-04-27 DIAGNOSIS — S52.501A CLOSED FRACTURE OF DISTAL ENDS OF RIGHT RADIUS AND ULNA, INITIAL ENCOUNTER: Primary | ICD-10-CM

## 2020-04-27 DIAGNOSIS — S62.101D CLOSED FRACTURE OF RIGHT WRIST WITH ROUTINE HEALING, SUBSEQUENT ENCOUNTER: ICD-10-CM

## 2020-04-27 PROCEDURE — 97110 THERAPEUTIC EXERCISES: CPT

## 2020-04-27 PROCEDURE — 97140 MANUAL THERAPY 1/> REGIONS: CPT

## 2020-04-27 PROCEDURE — 97530 THERAPEUTIC ACTIVITIES: CPT

## 2020-04-30 ENCOUNTER — OFFICE VISIT (OUTPATIENT)
Dept: OCCUPATIONAL THERAPY | Facility: MEDICAL CENTER | Age: 50
End: 2020-04-30
Payer: COMMERCIAL

## 2020-04-30 DIAGNOSIS — S62.101D CLOSED FRACTURE OF RIGHT WRIST WITH ROUTINE HEALING, SUBSEQUENT ENCOUNTER: ICD-10-CM

## 2020-04-30 DIAGNOSIS — S52.601A CLOSED FRACTURE OF DISTAL ENDS OF RIGHT RADIUS AND ULNA, INITIAL ENCOUNTER: Primary | ICD-10-CM

## 2020-04-30 DIAGNOSIS — S52.501A CLOSED FRACTURE OF DISTAL ENDS OF RIGHT RADIUS AND ULNA, INITIAL ENCOUNTER: Primary | ICD-10-CM

## 2020-04-30 PROCEDURE — 97110 THERAPEUTIC EXERCISES: CPT

## 2020-04-30 PROCEDURE — 97140 MANUAL THERAPY 1/> REGIONS: CPT

## 2020-05-04 ENCOUNTER — OFFICE VISIT (OUTPATIENT)
Dept: OCCUPATIONAL THERAPY | Facility: MEDICAL CENTER | Age: 50
End: 2020-05-04
Payer: COMMERCIAL

## 2020-05-04 DIAGNOSIS — S62.101D CLOSED FRACTURE OF RIGHT WRIST WITH ROUTINE HEALING, SUBSEQUENT ENCOUNTER: Primary | ICD-10-CM

## 2020-05-04 PROCEDURE — 97110 THERAPEUTIC EXERCISES: CPT

## 2020-05-04 PROCEDURE — 97140 MANUAL THERAPY 1/> REGIONS: CPT

## 2020-05-04 PROCEDURE — 97530 THERAPEUTIC ACTIVITIES: CPT

## 2020-05-07 ENCOUNTER — OFFICE VISIT (OUTPATIENT)
Dept: OCCUPATIONAL THERAPY | Facility: MEDICAL CENTER | Age: 50
End: 2020-05-07
Payer: COMMERCIAL

## 2020-05-07 DIAGNOSIS — S62.101D CLOSED FRACTURE OF RIGHT WRIST WITH ROUTINE HEALING, SUBSEQUENT ENCOUNTER: Primary | ICD-10-CM

## 2020-05-07 PROCEDURE — 97140 MANUAL THERAPY 1/> REGIONS: CPT

## 2020-05-07 PROCEDURE — 97530 THERAPEUTIC ACTIVITIES: CPT

## 2020-05-07 PROCEDURE — 97110 THERAPEUTIC EXERCISES: CPT

## 2020-05-11 ENCOUNTER — OFFICE VISIT (OUTPATIENT)
Dept: OCCUPATIONAL THERAPY | Facility: MEDICAL CENTER | Age: 50
End: 2020-05-11
Payer: COMMERCIAL

## 2020-05-11 DIAGNOSIS — S62.101D CLOSED FRACTURE OF RIGHT WRIST WITH ROUTINE HEALING, SUBSEQUENT ENCOUNTER: Primary | ICD-10-CM

## 2020-05-11 PROCEDURE — 97530 THERAPEUTIC ACTIVITIES: CPT

## 2020-05-11 PROCEDURE — 97110 THERAPEUTIC EXERCISES: CPT

## 2020-05-11 NOTE — OP SLP TREATMENT LOG
COGNITION SLP FLOW SHEET    SKILL AREA Progress CURRENT SESSION   SPEECH THERAPY: COGNITION     Attention  Moderate to complex sustained attention:  Rate 1-2 on:  Concentrating for long periods of time  Mind wanders more easily  Getting mentally tired more easily    Complete in distraction   Oriented to 5 types of attention, provided packet.  Introduced modification strategies for Difficulty, familiarity, enjoyment    Auditory attention- functional memory book, p 252  First trial- introduction 5/8  Second trial- 6/8  3rd trial- 8/8    Increased internal distractions, tearful.    Auditory attention:  4 word sentences:  Fwd: 10/10 (I), Bwd 10/10 (I)    6 word sentences:  Fwd: 10/10 (I); Bwd 8/10 min A (use of visuals, chunking)    Visual attention:  4 blocks: 3/4 (I), 4/4 mod A  Extra time, second guessing self.   Constant Therapy:  Picture N-Back Memory L2/3: 100% acc, 30%tile latency  Repeat number sequences L4/5: 80% acc, 25%tile latency  Pattern Recreation L6/12: 90% acc, 32%tile latency  Auditory Commands L4/10: 92% acc, 57%tile latency    Increased difficulty with task of longer periods of time.  (10 items, accuracy would decrease, particularly on auditory tasks.    Sustained attention to self-learning opportunity of unfamiliar task:  SET game: Reading instructions to learn   Improved ability to learn with joint attention from therapist to ask leading questions.      Continues to required Mod A to identify SET.    Orientation     Memory  Memory planner-12 phases- adapted to pt    Immediate memory:   Rep of complex sentences-90% of trials  Paragraph retell 80% of details    WM:Moderate  Mental calc  Mental manip    30+ min of delayed recall  Mental Manipulation 7/11, fatigue due to internal distraction  +++-+++  +---, pt became tearful due to frustration.  Task Dc'd.     Repeat number sequences L3/5: 84% accuracy, 32%tile latency    Visual memory:  Pattern Recreation L6/12: 71%tile accuracy, 17%tile  latency  Simple sentence repetition: (I) 10/10  Simple sentence fill-in:  8/10, min A to Supervision    Complex sentence repetition: 5/5 Mod I  Complex sentence fill-in: 13/19,     Memory: Visual 5 shapes structure.   Min A to use WRAP-CA  Supervision-min A for recall, 5/5.    Sustained attention with retrieval competed with self doubt, Difficulty initiating, difficulty with trial and deductive reasoning.  Decreased processing speed- taking a long time.             Processing    See APT scores         Problem Solving/Reasoning (Money/Math, Safety , Multistep )     Time Management Completed Time management orientation with phases 9-12.  Continue to monitor and manage with fading cues.      Planning/Organization (Executive Functioning) Visual processing bood: p 189. Ex. 36 Maps  Anticipated task to be a 2- simple to moderate.  Completed 1 map task in 5 minutes, found error and demonstrated difficulty self correcting error.   Practiced stating goal, refocusing to goal, sustaining attention to goal.     Symptom  Management  Attention Fatigue Scale: 3 or less    PSFS: goal: 80% 4, 5, DC'd task before therapist could recommend a break.   Pt states often finds self to be in 4-6, at times higher.  3- encouraged removal.    Education/ Strategy Training Provided attention packet and attention fatigue scale.  Pt requesting processing exercises.  CT login: [email or web address]  PW: [email or web address]  Discussed Quirjess, SET  Assigned Organized Mind to listen to- answer questions upon return. Did not complete   Other Reviewed sleep routines (11-7) and target goals, meal routines, water recommendations.     Practiced activities to practice at home.  CT, SET, joellen,   Dr. 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  November 1, part time- consider daily, decreased hours.     APT Tracking    Sustained attention  Date 9/16/19 10/1/2019 10/8/2019   Tasks              Ascending # (aud) 72% 3 effort  fast 100% 6 effort  slow 71% 7 effort  fast   Matching clocks (vis) 73% 5  effort  fast 100% 7 effort  slow 94% 8 effort fast       Alternating attention  Date 10/22/2019     Tasks              Happy/Sad (auditory) 23% 9 effort  fast     Hi-mid-low (Visual) 56% 9 effort  fast                Xenograft Text: The defect edges were debeveled with a #15 scalpel blade.  Given the location of the defect, shape of the defect and the proximity to free margins a xenograft was deemed most appropriate.  The graft was then trimmed to fit the size of the defect.  The graft was then placed in the primary defect and oriented appropriately.

## 2020-05-14 ENCOUNTER — OFFICE VISIT (OUTPATIENT)
Dept: OCCUPATIONAL THERAPY | Facility: MEDICAL CENTER | Age: 50
End: 2020-05-14
Payer: COMMERCIAL

## 2020-05-14 DIAGNOSIS — S62.101D CLOSED FRACTURE OF RIGHT WRIST WITH ROUTINE HEALING, SUBSEQUENT ENCOUNTER: Primary | ICD-10-CM

## 2020-05-14 PROCEDURE — 97110 THERAPEUTIC EXERCISES: CPT

## 2020-05-14 PROCEDURE — 97530 THERAPEUTIC ACTIVITIES: CPT

## 2020-05-18 ENCOUNTER — OFFICE VISIT (OUTPATIENT)
Dept: OCCUPATIONAL THERAPY | Facility: MEDICAL CENTER | Age: 50
End: 2020-05-18
Payer: COMMERCIAL

## 2020-05-18 DIAGNOSIS — S62.101D CLOSED FRACTURE OF RIGHT WRIST WITH ROUTINE HEALING, SUBSEQUENT ENCOUNTER: Primary | ICD-10-CM

## 2020-05-18 PROCEDURE — 97530 THERAPEUTIC ACTIVITIES: CPT

## 2020-05-18 PROCEDURE — 97110 THERAPEUTIC EXERCISES: CPT

## 2020-05-21 ENCOUNTER — OFFICE VISIT (OUTPATIENT)
Dept: OCCUPATIONAL THERAPY | Facility: MEDICAL CENTER | Age: 50
End: 2020-05-21
Payer: COMMERCIAL

## 2020-05-21 DIAGNOSIS — S62.101D CLOSED FRACTURE OF RIGHT WRIST WITH ROUTINE HEALING, SUBSEQUENT ENCOUNTER: Primary | ICD-10-CM

## 2020-05-21 PROCEDURE — 97530 THERAPEUTIC ACTIVITIES: CPT

## 2020-05-21 PROCEDURE — 97110 THERAPEUTIC EXERCISES: CPT

## 2020-05-26 ENCOUNTER — EVALUATION (OUTPATIENT)
Dept: OCCUPATIONAL THERAPY | Facility: MEDICAL CENTER | Age: 50
End: 2020-05-26
Payer: COMMERCIAL

## 2020-05-26 DIAGNOSIS — S62.101D CLOSED FRACTURE OF RIGHT WRIST WITH ROUTINE HEALING, SUBSEQUENT ENCOUNTER: Primary | ICD-10-CM

## 2020-05-26 PROCEDURE — 97530 THERAPEUTIC ACTIVITIES: CPT

## 2020-05-26 PROCEDURE — 97110 THERAPEUTIC EXERCISES: CPT

## 2020-05-28 ENCOUNTER — OFFICE VISIT (OUTPATIENT)
Dept: OCCUPATIONAL THERAPY | Facility: MEDICAL CENTER | Age: 50
End: 2020-05-28
Payer: COMMERCIAL

## 2020-05-28 DIAGNOSIS — S62.101D CLOSED FRACTURE OF RIGHT WRIST WITH ROUTINE HEALING, SUBSEQUENT ENCOUNTER: Primary | ICD-10-CM

## 2020-05-28 PROCEDURE — 97110 THERAPEUTIC EXERCISES: CPT

## 2020-05-28 PROCEDURE — 97530 THERAPEUTIC ACTIVITIES: CPT

## 2020-05-28 PROCEDURE — 97112 NEUROMUSCULAR REEDUCATION: CPT

## 2020-06-01 ENCOUNTER — OFFICE VISIT (OUTPATIENT)
Dept: OCCUPATIONAL THERAPY | Facility: MEDICAL CENTER | Age: 50
End: 2020-06-01
Payer: COMMERCIAL

## 2020-06-01 DIAGNOSIS — S62.101D CLOSED FRACTURE OF RIGHT WRIST WITH ROUTINE HEALING, SUBSEQUENT ENCOUNTER: Primary | ICD-10-CM

## 2020-06-01 PROCEDURE — 97530 THERAPEUTIC ACTIVITIES: CPT

## 2020-06-01 PROCEDURE — 97110 THERAPEUTIC EXERCISES: CPT

## 2020-06-04 ENCOUNTER — OFFICE VISIT (OUTPATIENT)
Dept: OCCUPATIONAL THERAPY | Facility: MEDICAL CENTER | Age: 50
End: 2020-06-04
Payer: COMMERCIAL

## 2020-06-04 DIAGNOSIS — S62.101D CLOSED FRACTURE OF RIGHT WRIST WITH ROUTINE HEALING, SUBSEQUENT ENCOUNTER: Primary | ICD-10-CM

## 2020-06-04 PROCEDURE — 97530 THERAPEUTIC ACTIVITIES: CPT

## 2020-06-04 PROCEDURE — 97110 THERAPEUTIC EXERCISES: CPT

## 2020-06-11 ENCOUNTER — OFFICE VISIT (OUTPATIENT)
Dept: OCCUPATIONAL THERAPY | Facility: MEDICAL CENTER | Age: 50
End: 2020-06-11
Payer: COMMERCIAL

## 2020-06-11 DIAGNOSIS — S62.101D CLOSED FRACTURE OF RIGHT WRIST WITH ROUTINE HEALING, SUBSEQUENT ENCOUNTER: Primary | ICD-10-CM

## 2020-06-11 PROCEDURE — 97530 THERAPEUTIC ACTIVITIES: CPT

## 2020-06-11 PROCEDURE — 97110 THERAPEUTIC EXERCISES: CPT

## (undated) DEVICE — SPONGE PVP SCRUB WING STERILE

## (undated) DEVICE — CHLORAPREP HI-LITE 26ML ORANGE

## (undated) DEVICE — MINI BLADE ROUND TIP SHARP ON ONE SIDE

## (undated) DEVICE — KNIFE LIGHT 10,PK: Brand: KNIFELIGHT

## (undated) DEVICE — BRUSH EZ SCRUB PCMX W/NAIL CLEANER

## (undated) DEVICE — DRILL 1.8 X 90MM AKA

## (undated) DEVICE — DRAPE C-ARM X-RAY

## (undated) DEVICE — ACE WRAP 4 IN UNSTERILE

## (undated) DEVICE — SUT NYLON 5-0 P-3 18 IN 690G

## (undated) DEVICE — 3M™ STERI-STRIP™ COMPOUND BENZOIN TINCTURE 40 BAGS/CARTON 4 CARTONS/CASE C1544: Brand: 3M™ STERI-STRIP™

## (undated) DEVICE — INTENDED FOR TISSUE SEPARATION, AND OTHER PROCEDURES THAT REQUIRE A SHARP SURGICAL BLADE TO PUNCTURE OR CUT.: Brand: BARD-PARKER ® CARBON RIB-BACK BLADES

## (undated) DEVICE — CUFF TOURNIQUET 18 X 4 IN QUICK CONNECT DISP 1 BLADDER

## (undated) DEVICE — GLOVE SRG BIOGEL 7

## (undated) DEVICE — DRILL TWIST 2.0 X 95MM

## (undated) DEVICE — IMPERVIOUS STOCKINETTE: Brand: DEROYAL

## (undated) DEVICE — PADDING CAST 4 IN  COTTON STRL

## (undated) DEVICE — SUT SILK 3-0 18 IN A184H

## (undated) DEVICE — SUT PLAIN 5-0 PC-1 18 IN 1915G

## (undated) DEVICE — SCREW CORTICAL 2.3 X 22MM
Type: IMPLANTABLE DEVICE | Site: WRIST | Status: NON-FUNCTIONAL
Removed: 2020-04-07

## (undated) DEVICE — GLOVE INDICATOR PI UNDERGLOVE SZ 7 BLUE

## (undated) DEVICE — 3M™ STERI-STRIP™ REINFORCED ADHESIVE SKIN CLOSURES, R1547, 1/2 IN X 4 IN (12 MM X 100 MM), 6 STRIPS/ENVELOPE: Brand: 3M™ STERI-STRIP™

## (undated) DEVICE — LIGHT HANDLE COVER SLEEVE DISP BLUE STELLAR

## (undated) DEVICE — DISPOSABLE EQUIPMENT COVER: Brand: SMALL TOWEL DRAPE

## (undated) DEVICE — SCREW CORTICAL 2.3 X 24MM
Type: IMPLANTABLE DEVICE | Site: WRIST | Status: NON-FUNCTIONAL
Removed: 2020-04-07

## (undated) DEVICE — GLOVE SRG BIOGEL 6.5

## (undated) DEVICE — DRILL TWIST 2.3MM

## (undated) DEVICE — STERILE BETHLEHEM PLASTIC HAND: Brand: CARDINAL HEALTH

## (undated) DEVICE — DRAPE SHEET THREE QUARTER

## (undated) DEVICE — SUT ETHIBOND 3-0 SH 30 IN X832H

## (undated) DEVICE — K-WIRE FIXATION 1.1 X 100MM SS
Type: IMPLANTABLE DEVICE | Site: WRIST | Status: NON-FUNCTIONAL
Removed: 2020-04-07

## (undated) DEVICE — SUT MONOCRYL 3-0 PS-2 18 IN Y497G

## (undated) DEVICE — STOCKINETTE REGULAR

## (undated) DEVICE — STRETCH BANDAGE: Brand: CURITY

## (undated) DEVICE — SCREW CORTICAL 3.2 X 16MM
Type: IMPLANTABLE DEVICE | Site: WRIST | Status: NON-FUNCTIONAL
Removed: 2020-04-07

## (undated) DEVICE — INTENDED FOR TISSUE SEPARATION, AND OTHER PROCEDURES THAT REQUIRE A SHARP SURGICAL BLADE TO PUNCTURE OR CUT.: Brand: BARD-PARKER SAFETY BLADES SIZE 15, STERILE

## (undated) DEVICE — SUT MONOCRYL 4-0 PS-2 18 IN Y496G

## (undated) DEVICE — SPLINT 4 X 15 IN FAST SET PLASTER

## (undated) DEVICE — GLOVE INDICATOR PI UNDERGLOVE SZ 6.5 BLUE

## (undated) DEVICE — COBAN 2 IN UNSTERILE